# Patient Record
Sex: FEMALE | Race: OTHER | Employment: FULL TIME | URBAN - METROPOLITAN AREA
[De-identification: names, ages, dates, MRNs, and addresses within clinical notes are randomized per-mention and may not be internally consistent; named-entity substitution may affect disease eponyms.]

---

## 2017-03-03 ENCOUNTER — OFFICE VISIT (OUTPATIENT)
Dept: PEDIATRICS | Age: 57
End: 2017-03-03

## 2017-03-03 VITALS
TEMPERATURE: 98.7 F | DIASTOLIC BLOOD PRESSURE: 74 MMHG | HEART RATE: 80 BPM | OXYGEN SATURATION: 98 % | RESPIRATION RATE: 18 BRPM | SYSTOLIC BLOOD PRESSURE: 112 MMHG

## 2017-03-03 DIAGNOSIS — J01.90 ACUTE SINUSITIS, RECURRENCE NOT SPECIFIED, UNSPECIFIED LOCATION: Primary | ICD-10-CM

## 2017-03-03 PROCEDURE — 1036F TOBACCO NON-USER: CPT | Performed by: NURSE PRACTITIONER

## 2017-03-03 PROCEDURE — 3017F COLORECTAL CA SCREEN DOC REV: CPT | Performed by: NURSE PRACTITIONER

## 2017-03-03 PROCEDURE — G8484 FLU IMMUNIZE NO ADMIN: HCPCS | Performed by: NURSE PRACTITIONER

## 2017-03-03 PROCEDURE — G8427 DOCREV CUR MEDS BY ELIG CLIN: HCPCS | Performed by: NURSE PRACTITIONER

## 2017-03-03 PROCEDURE — 3014F SCREEN MAMMO DOC REV: CPT | Performed by: NURSE PRACTITIONER

## 2017-03-03 PROCEDURE — 99213 OFFICE O/P EST LOW 20 MIN: CPT | Performed by: NURSE PRACTITIONER

## 2017-03-03 PROCEDURE — G8421 BMI NOT CALCULATED: HCPCS | Performed by: NURSE PRACTITIONER

## 2017-03-03 RX ORDER — INFLUENZA VIRUS VACCINE 15; 15; 15; 15 UG/.5ML; UG/.5ML; UG/.5ML; UG/.5ML
SUSPENSION INTRAMUSCULAR
Refills: 0 | COMMUNITY
Start: 2016-12-29 | End: 2022-05-02

## 2017-03-03 RX ORDER — AZITHROMYCIN 250 MG/1
TABLET, FILM COATED ORAL
Qty: 1 PACKET | Refills: 0 | Status: SHIPPED | OUTPATIENT
Start: 2017-03-03 | End: 2018-03-15 | Stop reason: ALTCHOICE

## 2017-03-03 ASSESSMENT — ENCOUNTER SYMPTOMS
VOMITING: 0
NAUSEA: 0
EYE REDNESS: 0
ABDOMINAL PAIN: 0
WHEEZING: 0
HOARSE VOICE: 0
SORE THROAT: 1
SWOLLEN GLANDS: 1
EYE PAIN: 0
EYE DISCHARGE: 0
SHORTNESS OF BREATH: 0
COUGH: 1
SINUS PRESSURE: 1
TROUBLE SWALLOWING: 0

## 2017-04-10 ENCOUNTER — OFFICE VISIT (OUTPATIENT)
Dept: PEDIATRICS | Age: 57
End: 2017-04-10

## 2017-04-10 VITALS
WEIGHT: 138 LBS | OXYGEN SATURATION: 97 % | BODY MASS INDEX: 26.06 KG/M2 | TEMPERATURE: 98.9 F | DIASTOLIC BLOOD PRESSURE: 74 MMHG | HEART RATE: 83 BPM | RESPIRATION RATE: 17 BRPM | HEIGHT: 61 IN | SYSTOLIC BLOOD PRESSURE: 118 MMHG

## 2017-04-10 DIAGNOSIS — J01.90 ACUTE NON-RECURRENT SINUSITIS, UNSPECIFIED LOCATION: Primary | ICD-10-CM

## 2017-04-10 PROCEDURE — G8427 DOCREV CUR MEDS BY ELIG CLIN: HCPCS | Performed by: NURSE PRACTITIONER

## 2017-04-10 PROCEDURE — 99213 OFFICE O/P EST LOW 20 MIN: CPT | Performed by: NURSE PRACTITIONER

## 2017-04-10 PROCEDURE — 3014F SCREEN MAMMO DOC REV: CPT | Performed by: NURSE PRACTITIONER

## 2017-04-10 PROCEDURE — G8419 CALC BMI OUT NRM PARAM NOF/U: HCPCS | Performed by: NURSE PRACTITIONER

## 2017-04-10 PROCEDURE — 3017F COLORECTAL CA SCREEN DOC REV: CPT | Performed by: NURSE PRACTITIONER

## 2017-04-10 PROCEDURE — 1036F TOBACCO NON-USER: CPT | Performed by: NURSE PRACTITIONER

## 2017-04-10 RX ORDER — AZITHROMYCIN 250 MG/1
TABLET, FILM COATED ORAL
Qty: 1 PACKET | Refills: 0 | Status: SHIPPED | OUTPATIENT
Start: 2017-04-10 | End: 2018-03-15 | Stop reason: ALTCHOICE

## 2017-04-10 ASSESSMENT — ENCOUNTER SYMPTOMS
SINUS PRESSURE: 1
RHINORRHEA: 0
SWOLLEN GLANDS: 0
SHORTNESS OF BREATH: 0
WHEEZING: 0
COUGH: 1
HEARTBURN: 0
HOARSE VOICE: 1
SINUS COMPLAINT: 1
SORE THROAT: 0
HEMOPTYSIS: 0

## 2018-03-15 ENCOUNTER — OFFICE VISIT (OUTPATIENT)
Dept: PEDIATRICS CLINIC | Age: 58
End: 2018-03-15
Payer: COMMERCIAL

## 2018-03-15 VITALS
HEART RATE: 93 BPM | WEIGHT: 146.6 LBS | BODY MASS INDEX: 27.7 KG/M2 | OXYGEN SATURATION: 98 % | TEMPERATURE: 98.8 F | DIASTOLIC BLOOD PRESSURE: 80 MMHG | SYSTOLIC BLOOD PRESSURE: 120 MMHG

## 2018-03-15 DIAGNOSIS — J01.90 ACUTE BACTERIAL SINUSITIS: Primary | ICD-10-CM

## 2018-03-15 DIAGNOSIS — B96.89 ACUTE BACTERIAL SINUSITIS: Primary | ICD-10-CM

## 2018-03-15 PROCEDURE — 99214 OFFICE O/P EST MOD 30 MIN: CPT | Performed by: NURSE PRACTITIONER

## 2018-03-15 RX ORDER — AZITHROMYCIN 250 MG/1
TABLET, FILM COATED ORAL
Qty: 1 PACKET | Refills: 0 | Status: SHIPPED | OUTPATIENT
Start: 2018-03-15 | End: 2022-05-02

## 2018-04-05 PROBLEM — F41.1 ANXIETY STATE: Status: ACTIVE | Noted: 2018-04-05

## 2018-04-05 PROBLEM — N92.6 IRREGULAR PERIODS: Status: ACTIVE | Noted: 2018-04-05

## 2018-04-05 PROBLEM — L21.9 SEBORRHEA: Status: ACTIVE | Noted: 2018-04-05

## 2018-04-05 PROBLEM — M25.519 SHOULDER JOINT PAIN: Status: ACTIVE | Noted: 2018-04-05

## 2018-04-05 PROBLEM — F32.A DEPRESSIVE DISORDER: Status: ACTIVE | Noted: 2018-04-05

## 2018-04-05 PROBLEM — J32.9 CHRONIC SINUSITIS: Status: ACTIVE | Noted: 2018-04-05

## 2018-04-05 PROBLEM — M79.7 FIBROMYOSITIS: Status: ACTIVE | Noted: 2018-04-05

## 2018-04-05 PROBLEM — L40.9 PSORIASIS: Status: ACTIVE | Noted: 2018-04-05

## 2018-04-05 PROBLEM — M25.50 PAIN IN JOINT: Status: ACTIVE | Noted: 2018-04-05

## 2018-04-05 PROBLEM — N95.1 MENOPAUSAL SYMPTOM: Status: ACTIVE | Noted: 2018-04-05

## 2018-04-05 PROBLEM — M54.50 LOW BACK PAIN: Status: ACTIVE | Noted: 2018-04-05

## 2018-04-05 PROBLEM — R53.81 MALAISE AND FATIGUE: Status: ACTIVE | Noted: 2018-04-05

## 2018-04-05 PROBLEM — L25.5 CONTACT DERMATITIS DUE TO PLANTS, EXCEPT FOOD: Status: ACTIVE | Noted: 2018-04-05

## 2018-04-05 PROBLEM — E55.9 VITAMIN D DEFICIENCY: Status: ACTIVE | Noted: 2018-04-05

## 2018-04-05 PROBLEM — L20.9 ATOPIC DERMATITIS: Status: ACTIVE | Noted: 2018-04-05

## 2018-04-05 PROBLEM — R10.9 ABDOMINAL PAIN: Status: ACTIVE | Noted: 2018-04-05

## 2018-04-05 PROBLEM — R53.83 MALAISE AND FATIGUE: Status: ACTIVE | Noted: 2018-04-05

## 2018-04-05 PROBLEM — S13.9XXA NECK SPRAIN: Status: ACTIVE | Noted: 2018-04-05

## 2018-04-05 RX ORDER — CLOBETASOL PROPIONATE 0.05 G/100ML
SHAMPOO TOPICAL
Refills: 0 | COMMUNITY
Start: 2018-01-03 | End: 2022-05-02

## 2018-04-05 ASSESSMENT — ENCOUNTER SYMPTOMS
SHORTNESS OF BREATH: 0
HEMOPTYSIS: 0
DIARRHEA: 0
HEARTBURN: 0
ABDOMINAL PAIN: 0
SINUS PRESSURE: 1
SINUS PAIN: 1
COUGH: 1
VOMITING: 0
WHEEZING: 0
SORE THROAT: 1
RHINORRHEA: 1
TROUBLE SWALLOWING: 0

## 2019-03-22 ENCOUNTER — OFFICE VISIT (OUTPATIENT)
Dept: PEDIATRICS CLINIC | Age: 59
End: 2019-03-22
Payer: COMMERCIAL

## 2019-03-22 VITALS
SYSTOLIC BLOOD PRESSURE: 106 MMHG | TEMPERATURE: 98.2 F | RESPIRATION RATE: 14 BRPM | HEART RATE: 96 BPM | OXYGEN SATURATION: 98 % | DIASTOLIC BLOOD PRESSURE: 72 MMHG

## 2019-03-22 DIAGNOSIS — B96.89 ACUTE BACTERIAL SINUSITIS: Primary | ICD-10-CM

## 2019-03-22 DIAGNOSIS — J01.90 ACUTE BACTERIAL SINUSITIS: Primary | ICD-10-CM

## 2019-03-22 PROCEDURE — 1036F TOBACCO NON-USER: CPT | Performed by: NURSE PRACTITIONER

## 2019-03-22 PROCEDURE — G8428 CUR MEDS NOT DOCUMENT: HCPCS | Performed by: NURSE PRACTITIONER

## 2019-03-22 PROCEDURE — 3017F COLORECTAL CA SCREEN DOC REV: CPT | Performed by: NURSE PRACTITIONER

## 2019-03-22 PROCEDURE — G8484 FLU IMMUNIZE NO ADMIN: HCPCS | Performed by: NURSE PRACTITIONER

## 2019-03-22 PROCEDURE — 99213 OFFICE O/P EST LOW 20 MIN: CPT | Performed by: NURSE PRACTITIONER

## 2019-03-22 PROCEDURE — G8421 BMI NOT CALCULATED: HCPCS | Performed by: NURSE PRACTITIONER

## 2019-03-22 RX ORDER — AZITHROMYCIN 250 MG/1
250 TABLET, FILM COATED ORAL SEE ADMIN INSTRUCTIONS
Qty: 6 TABLET | Refills: 0 | Status: SHIPPED | OUTPATIENT
Start: 2019-03-22 | End: 2022-05-02 | Stop reason: SDUPTHER

## 2019-03-27 ASSESSMENT — ENCOUNTER SYMPTOMS
NAUSEA: 0
COUGH: 0
SINUS PRESSURE: 1
VOMITING: 0
SWOLLEN GLANDS: 0
RHINORRHEA: 1
HOARSE VOICE: 0
TROUBLE SWALLOWING: 0
SINUS COMPLAINT: 1
WHEEZING: 0
SINUS PAIN: 1
SORE THROAT: 0
ABDOMINAL PAIN: 0
SHORTNESS OF BREATH: 0

## 2021-01-07 LAB
SARS-COV-2: NOT DETECTED
SOURCE: NORMAL

## 2021-01-13 LAB
SARS-COV-2: NOT DETECTED
SOURCE: NORMAL

## 2021-01-20 LAB
SARS-COV-2: NOT DETECTED
SOURCE: NORMAL

## 2021-01-26 LAB
SARS-COV-2: NOT DETECTED
SOURCE: NORMAL

## 2021-02-03 LAB
SARS-COV-2: NOT DETECTED
SOURCE: NORMAL

## 2021-02-11 LAB
SARS-COV-2: NOT DETECTED
SOURCE: NORMAL

## 2021-02-23 LAB
SARS-COV-2: NOT DETECTED
SOURCE: NORMAL

## 2021-03-02 LAB
SARS-COV-2: NOT DETECTED
SOURCE: NORMAL

## 2021-03-09 LAB
SARS-COV-2: NOT DETECTED
SOURCE: NORMAL

## 2021-03-17 LAB
SARS-COV-2: NOT DETECTED
SOURCE: NORMAL

## 2022-05-02 ENCOUNTER — OFFICE VISIT (OUTPATIENT)
Dept: PEDIATRICS CLINIC | Age: 62
End: 2022-05-02
Payer: COMMERCIAL

## 2022-05-02 VITALS — TEMPERATURE: 98.4 F | WEIGHT: 141.4 LBS | OXYGEN SATURATION: 97 % | HEART RATE: 87 BPM | BODY MASS INDEX: 26.72 KG/M2

## 2022-05-02 DIAGNOSIS — B96.89 ACUTE BACTERIAL SINUSITIS: ICD-10-CM

## 2022-05-02 DIAGNOSIS — J01.90 ACUTE BACTERIAL SINUSITIS: ICD-10-CM

## 2022-05-02 PROBLEM — R23.8 FACIAL AGING: Status: ACTIVE | Noted: 2018-04-27

## 2022-05-02 PROCEDURE — 3017F COLORECTAL CA SCREEN DOC REV: CPT | Performed by: NURSE PRACTITIONER

## 2022-05-02 PROCEDURE — 99213 OFFICE O/P EST LOW 20 MIN: CPT | Performed by: NURSE PRACTITIONER

## 2022-05-02 PROCEDURE — 1036F TOBACCO NON-USER: CPT | Performed by: NURSE PRACTITIONER

## 2022-05-02 PROCEDURE — G8419 CALC BMI OUT NRM PARAM NOF/U: HCPCS | Performed by: NURSE PRACTITIONER

## 2022-05-02 PROCEDURE — G8427 DOCREV CUR MEDS BY ELIG CLIN: HCPCS | Performed by: NURSE PRACTITIONER

## 2022-05-02 RX ORDER — AZITHROMYCIN 250 MG/1
250 TABLET, FILM COATED ORAL SEE ADMIN INSTRUCTIONS
Qty: 6 TABLET | Refills: 0 | Status: SHIPPED | OUTPATIENT
Start: 2022-05-02 | End: 2022-05-07

## 2022-05-02 ASSESSMENT — ENCOUNTER SYMPTOMS
COUGH: 1
TROUBLE SWALLOWING: 0
EYE REDNESS: 0
EYE ITCHING: 0
NAUSEA: 0
CONSTIPATION: 0
ABDOMINAL PAIN: 0
SWOLLEN GLANDS: 1
WHEEZING: 0
DIARRHEA: 0
RHINORRHEA: 1
SHORTNESS OF BREATH: 0
HEMOPTYSIS: 0
EYE PAIN: 0
HOARSE VOICE: 1
SINUS PAIN: 1
SINUS PRESSURE: 1
EYE DISCHARGE: 0
VOMITING: 0
SORE THROAT: 1
HEARTBURN: 0
CHEST TIGHTNESS: 0

## 2022-05-02 ASSESSMENT — VISUAL ACUITY: OU: 1

## 2022-05-02 NOTE — PROGRESS NOTES
Subjective:      Patient ID: Stuart Vanessa is a 62 y.o. female who present today with:      Chief Complaint   Patient presents with    Pharyngitis    Sinusitis    Cough    Otalgia    Congestion     Sinusitis  This is a new problem. The current episode started in the past 7 days. The problem is unchanged. There has been no fever. Her pain is at a severity of 8/10. The pain is severe. Associated symptoms include congestion, coughing, ear pain, headaches, a hoarse voice, sinus pressure, a sore throat and swollen glands. Pertinent negatives include no chills, diaphoresis, neck pain, shortness of breath or sneezing. Treatments tried: Lozenges, vicks. The treatment provided no relief. Cough  This is a new problem. The current episode started in the past 7 days. The problem has been unchanged. The problem occurs constantly. The cough is non-productive. Associated symptoms include ear congestion, ear pain, headaches, nasal congestion, postnasal drip, rhinorrhea and a sore throat. Pertinent negatives include no chest pain, chills, eye redness, fever, heartburn, hemoptysis, myalgias, rash, shortness of breath, sweats, weight loss or wheezing. The symptoms are aggravated by lying down. She has tried rest (Lozenges, vicks) for the symptoms. The treatment provided no relief. Her past medical history is significant for environmental allergies. There is no history of asthma, bronchiectasis, bronchitis, COPD, emphysema or pneumonia.      Past Medical History:   Diagnosis Date    Fibromyalgia      Patient Active Problem List    Diagnosis Date Noted    Abdominal pain 04/05/2018    Anxiety state 04/05/2018    Atopic dermatitis 04/05/2018    Chronic sinusitis 04/05/2018    Contact dermatitis due to plants, except food 04/05/2018    Depressive disorder 04/05/2018    Fibromyositis 04/05/2018    Irregular periods 04/05/2018    Pain in joint 04/05/2018    Low back pain 04/05/2018    Malaise and fatigue 04/05/2018    Menopausal symptom 04/05/2018    Neck sprain 04/05/2018    Psoriasis 04/05/2018    Seborrhea 04/05/2018    Shoulder joint pain 04/05/2018    Vitamin D deficiency 04/05/2018     Past Surgical History:   Procedure Laterality Date    KNEE SURGERY      left knee liagaments     Social History     Socioeconomic History    Marital status:      Spouse name: None    Number of children: None    Years of education: None    Highest education level: None   Occupational History    None   Tobacco Use    Smoking status: Never Smoker    Smokeless tobacco: Never Used   Substance and Sexual Activity    Alcohol use: None    Drug use: None    Sexual activity: None   Other Topics Concern    None   Social History Narrative    None     Social Determinants of Health     Financial Resource Strain:     Difficulty of Paying Living Expenses: Not on file   Food Insecurity:     Worried About Running Out of Food in the Last Year: Not on file    Cass of Food in the Last Year: Not on file   Transportation Needs:     Lack of Transportation (Medical): Not on file    Lack of Transportation (Non-Medical):  Not on file   Physical Activity:     Days of Exercise per Week: Not on file    Minutes of Exercise per Session: Not on file   Stress:     Feeling of Stress : Not on file   Social Connections:     Frequency of Communication with Friends and Family: Not on file    Frequency of Social Gatherings with Friends and Family: Not on file    Attends Gnosticism Services: Not on file    Active Member of Clubs or Organizations: Not on file    Attends Club or Organization Meetings: Not on file    Marital Status: Not on file   Intimate Partner Violence:     Fear of Current or Ex-Partner: Not on file    Emotionally Abused: Not on file    Physically Abused: Not on file    Sexually Abused: Not on file   Housing Stability:     Unable to Pay for Housing in the Last Year: Not on file    Number of [place] in the Last Year: Not on file    Unstable Housing in the Last Year: Not on file     No current outpatient medications on file prior to visit. No current facility-administered medications on file prior to visit. Allergies   Allergen Reactions    Cephalosporins     Ibuprofen     Pcn [Penicillins]       Review of Systems   Constitutional: Positive for activity change. Negative for appetite change, chills, diaphoresis, fever and weight loss. HENT: Positive for congestion, ear pain, hoarse voice, postnasal drip, rhinorrhea, sinus pressure, sinus pain and sore throat. Negative for sneezing and trouble swallowing. Eyes: Negative for pain, discharge, redness and itching. Respiratory: Positive for cough. Negative for hemoptysis, chest tightness, shortness of breath and wheezing. Cardiovascular: Negative for chest pain. Gastrointestinal: Negative for abdominal pain, constipation, diarrhea, heartburn, nausea and vomiting. Musculoskeletal: Negative for myalgias and neck pain. Skin: Negative for rash. Allergic/Immunologic: Positive for environmental allergies. Neurological: Positive for headaches. Negative for seizures and syncope. Objective:      Vitals:    05/02/22 1312   Pulse: 87   Temp: 98.4 °F (36.9 °C)   TempSrc: Temporal   SpO2: 97%   Weight: 141 lb 6.4 oz (64.1 kg)     Physical Exam  Constitutional:       General: She is not in acute distress. Appearance: Normal appearance. She is ill-appearing. HENT:      Head: Normocephalic and atraumatic. Right Ear: Hearing, ear canal and external ear normal. A middle ear effusion is present. Left Ear: Hearing, ear canal and external ear normal. A middle ear effusion is present. Nose: Congestion and rhinorrhea present. Rhinorrhea is purulent. Right Turbinates: Enlarged and swollen. Left Turbinates: Enlarged and swollen. Right Sinus: Maxillary sinus tenderness and frontal sinus tenderness present.       Left Sinus: Maxillary sinus tenderness and frontal sinus tenderness present. Mouth/Throat:      Lips: Pink. Mouth: Mucous membranes are moist.      Pharynx: Uvula midline. Posterior oropharyngeal erythema present. No oropharyngeal exudate or uvula swelling. Tonsils: No tonsillar exudate. Eyes:      General: Lids are normal. Vision grossly intact. Extraocular Movements: Extraocular movements intact. Conjunctiva/sclera: Conjunctivae normal.      Pupils: Pupils are equal, round, and reactive to light. Cardiovascular:      Rate and Rhythm: Normal rate and regular rhythm. Heart sounds: Normal heart sounds. Pulmonary:      Effort: Pulmonary effort is normal.      Breath sounds: Normal breath sounds and air entry. Lymphadenopathy:      Head:      Right side of head: Submandibular and tonsillar adenopathy present. Left side of head: Submandibular and tonsillar adenopathy present. Cervical: Cervical adenopathy present. Skin:     General: Skin is warm and dry. Findings: No rash. Neurological:      Mental Status: She is alert. Assessment & Plan:   Kusum Stewart was seen today for pharyngitis, sinusitis, cough, otalgia and congestion. Diagnoses and all orders for this visit:    Acute bacterial sinusitis  -     azithromycin (ZITHROMAX) 250 MG tablet; Take 1 tablet by mouth See Admin Instructions for 5 days 500mg on day 1 followed by 250mg on days 2 - 5    Side effects, adverse effects of the medication prescribed today, as well as treatment plan/ rationale and result expectations have been discussed with the patient who expresses understanding and desires to proceed. Close follow up to evaluate treatment results and for coordination of care. I have reviewed the patient's medical history in detail and updated the computerized patient record. As always, patient is advised that if symptoms worsen in any way they will proceed to the nearest emergency room.      Follow up in 48-72 hours if symptoms persist or worsen and as needed    [facility], APRN - CNP

## 2023-01-03 ENCOUNTER — OFFICE VISIT (OUTPATIENT)
Dept: PEDIATRICS CLINIC | Age: 63
End: 2023-01-03
Payer: COMMERCIAL

## 2023-01-03 VITALS — TEMPERATURE: 98.3 F | RESPIRATION RATE: 14 BRPM | OXYGEN SATURATION: 98 % | HEART RATE: 82 BPM

## 2023-01-03 DIAGNOSIS — J01.90 ACUTE BACTERIAL SINUSITIS: ICD-10-CM

## 2023-01-03 DIAGNOSIS — B96.89 ACUTE BACTERIAL SINUSITIS: ICD-10-CM

## 2023-01-03 PROCEDURE — G8484 FLU IMMUNIZE NO ADMIN: HCPCS | Performed by: NURSE PRACTITIONER

## 2023-01-03 PROCEDURE — 99213 OFFICE O/P EST LOW 20 MIN: CPT | Performed by: NURSE PRACTITIONER

## 2023-01-03 PROCEDURE — G8427 DOCREV CUR MEDS BY ELIG CLIN: HCPCS | Performed by: NURSE PRACTITIONER

## 2023-01-03 PROCEDURE — 1036F TOBACCO NON-USER: CPT | Performed by: NURSE PRACTITIONER

## 2023-01-03 PROCEDURE — G8419 CALC BMI OUT NRM PARAM NOF/U: HCPCS | Performed by: NURSE PRACTITIONER

## 2023-01-03 PROCEDURE — 3017F COLORECTAL CA SCREEN DOC REV: CPT | Performed by: NURSE PRACTITIONER

## 2023-01-03 RX ORDER — PREDNISONE 20 MG/1
40 TABLET ORAL DAILY
Qty: 10 TABLET | Refills: 0 | Status: SHIPPED | OUTPATIENT
Start: 2023-01-03 | End: 2023-01-08

## 2023-01-03 RX ORDER — AZITHROMYCIN 250 MG/1
250 TABLET, FILM COATED ORAL SEE ADMIN INSTRUCTIONS
Qty: 6 TABLET | Refills: 0 | Status: SHIPPED | OUTPATIENT
Start: 2023-01-03 | End: 2023-01-08

## 2023-01-03 RX ORDER — BUSPIRONE HYDROCHLORIDE 7.5 MG/1
TABLET ORAL
COMMUNITY
Start: 2022-12-15

## 2023-01-03 RX ORDER — FLUOCINONIDE TOPICAL SOLUTION USP, 0.05% 0.5 MG/ML
SOLUTION TOPICAL
COMMUNITY
Start: 2022-12-15

## 2023-01-03 ASSESSMENT — ENCOUNTER SYMPTOMS
SHORTNESS OF BREATH: 0
SORE THROAT: 1
SINUS PAIN: 1
CONSTIPATION: 0
EYE REDNESS: 0
EYE PAIN: 0
COUGH: 1
CHEST TIGHTNESS: 1
WHEEZING: 1
HEARTBURN: 0
NAUSEA: 1
RHINORRHEA: 1
HEMOPTYSIS: 0
ABDOMINAL PAIN: 1
TROUBLE SWALLOWING: 0
SINUS PRESSURE: 1
VOMITING: 0
EYE DISCHARGE: 0
DIARRHEA: 0
EYE ITCHING: 0

## 2023-01-03 ASSESSMENT — VISUAL ACUITY: OU: 1

## 2023-01-03 NOTE — PROGRESS NOTES
Subjective:      Patient ID: Dixie Malhotra is a 65 y.o. female who present today with:      Chief Complaint   Patient presents with    Cough    Congestion     Cough  This is a new problem. The current episode started 1 to 4 weeks ago. The problem has been rapidly worsening. The problem occurs constantly. The cough is Non-productive. Associated symptoms include chest pain, ear congestion, headaches, nasal congestion, postnasal drip, rhinorrhea, a sore throat and wheezing. Pertinent negatives include no chills, ear pain, eye redness, fever, heartburn, hemoptysis, myalgias, rash, shortness of breath, sweats or weight loss. The symptoms are aggravated by lying down. She has tried OTC cough suppressant for the symptoms. The treatment provided no relief. There is no history of asthma, bronchiectasis, bronchitis, COPD, emphysema, environmental allergies or pneumonia.        Past Medical History:   Diagnosis Date    Fibromyalgia      Patient Active Problem List    Diagnosis Date Noted    Facial aging 04/27/2018    Displacement of lumbar intervertebral disc without myelopathy 11/05/2001    Sprain of lumbar region 11/05/2001    Abdominal pain 04/05/2018    Anxiety state 04/05/2018    Atopic dermatitis 04/05/2018    Chronic sinusitis 04/05/2018    Contact dermatitis due to plants, except food 04/05/2018    Depressive disorder 04/05/2018    Fibromyositis 04/05/2018    Irregular periods 04/05/2018    Pain in joint 04/05/2018    Low back pain 04/05/2018    Malaise and fatigue 04/05/2018    Menopausal symptom 04/05/2018    Neck sprain 04/05/2018    Psoriasis 04/05/2018    Seborrhea 04/05/2018    Shoulder joint pain 04/05/2018    Vitamin D deficiency 04/05/2018     Past Surgical History:   Procedure Laterality Date    KNEE SURGERY      left knee liagaments     Social History     Socioeconomic History    Marital status:      Spouse name: None    Number of children: None    Years of education: None    Highest education level: None   Tobacco Use    Smoking status: Never    Smokeless tobacco: Never     Current Outpatient Medications on File Prior to Visit   Medication Sig Dispense Refill    busPIRone (BUSPAR) 7.5 MG tablet TAKE 1 TABLET BY MOUTH TWICE A DAY FOR ANXIETY      fluocinonide (LIDEX) 0.05 % external solution APPLY TO SCALP 3 TIMES A WEEK- AND LEAVE IN -DO NOT [address] OUT       No current facility-administered medications on file prior to visit. Allergies   Allergen Reactions    Cephalosporins     Ibuprofen     Pcn [Penicillins]            Review of Systems   Constitutional:  Positive for activity change, appetite change and fatigue. Negative for chills, fever and weight loss. HENT:  Positive for congestion, postnasal drip, rhinorrhea, sinus pressure, sinus pain and sore throat. Negative for ear pain and trouble swallowing. Eyes:  Negative for pain, discharge, redness and itching. Respiratory:  Positive for cough, chest tightness and wheezing. Negative for hemoptysis and shortness of breath. Cardiovascular:  Positive for chest pain. Gastrointestinal:  Positive for abdominal pain and nausea. Negative for constipation, diarrhea, heartburn and vomiting. Musculoskeletal:  Negative for arthralgias and myalgias. Skin:  Negative for rash. Allergic/Immunologic: Negative for environmental allergies. Neurological:  Positive for headaches. Negative for seizures and syncope. Objective:      Vitals:    01/03/23 1018   Pulse: 82   Resp: 14   Temp: 98.3 °F (36.8 °C)   TempSrc: Temporal   SpO2: 98%         Physical Exam  Constitutional:       General: She is not in acute distress. Appearance: Normal appearance. She is not ill-appearing. HENT:      Head: Normocephalic and atraumatic. Right Ear: Hearing, ear canal and external ear normal. A middle ear effusion is present. Left Ear: Hearing, ear canal and external ear normal. A middle ear effusion is present. Nose: Congestion and rhinorrhea present. Rhinorrhea is purulent. Right Sinus: Maxillary sinus tenderness and frontal sinus tenderness present. Left Sinus: Maxillary sinus tenderness and frontal sinus tenderness present. Mouth/Throat:      Lips: Pink. Mouth: Mucous membranes are moist.      Pharynx: Uvula midline. Posterior oropharyngeal erythema present. No pharyngeal swelling, oropharyngeal exudate or uvula swelling. Tonsils: No tonsillar exudate. Eyes:      General: Lids are normal. Vision grossly intact. Extraocular Movements: Extraocular movements intact. Conjunctiva/sclera: Conjunctivae normal.      Pupils: Pupils are equal, round, and reactive to light. Cardiovascular:      Rate and Rhythm: Normal rate and regular rhythm. Heart sounds: Normal heart sounds. Pulmonary:      Effort: Pulmonary effort is normal.      Breath sounds: Wheezing present. Comments: Scant expiratory wheeze upper lobes bilaterally  Lymphadenopathy:      Head:      Right side of head: Submandibular and tonsillar adenopathy present. Left side of head: Submandibular and tonsillar adenopathy present. Cervical: Cervical adenopathy present. Skin:     General: Skin is warm and dry. Findings: No rash. Neurological:      Mental Status: She is alert. Assessment & Plan:   Sonja Veras was seen today for cough and congestion. Diagnoses and all orders for this visit:    Acute bacterial sinusitis  -     azithromycin (ZITHROMAX) 250 MG tablet; Take 1 tablet by mouth See Admin Instructions for 5 days 500mg on day 1 followed by 250mg on days 2 - 5  -     predniSONE (DELTASONE) 20 MG tablet; Take 2 tablets by mouth daily for 5 days    Side effects, adverse effects of the medication prescribed today, as well as treatment plan/ rationale and result expectations have been discussed with the patient who expresses understanding and desires to proceed. Close follow up to evaluate treatment results and for coordination of care.   I have reviewed the patient's medical history in detail and updated the computerized patient record. As always, patient is advised that if symptoms worsen in any way they will proceed to the nearest emergency room.      Follow up in 48-72 hours if symptoms persist or worsen and as needed    Saeed Prom, APRN - CNP

## 2023-02-27 LAB
ALANINE AMINOTRANSFERASE (SGPT) (U/L) IN SER/PLAS: 18 U/L (ref 7–45)
ALBUMIN (G/DL) IN SER/PLAS: 4.5 G/DL (ref 3.4–5)
ALKALINE PHOSPHATASE (U/L) IN SER/PLAS: 54 U/L (ref 33–136)
ANION GAP IN SER/PLAS: 12 MMOL/L (ref 10–20)
ASPARTATE AMINOTRANSFERASE (SGOT) (U/L) IN SER/PLAS: 22 U/L (ref 9–39)
BILIRUBIN TOTAL (MG/DL) IN SER/PLAS: 0.5 MG/DL (ref 0–1.2)
CALCIUM (MG/DL) IN SER/PLAS: 9.8 MG/DL (ref 8.6–10.3)
CARBON DIOXIDE, TOTAL (MMOL/L) IN SER/PLAS: 30 MMOL/L (ref 21–32)
CHLORIDE (MMOL/L) IN SER/PLAS: 101 MMOL/L (ref 98–107)
CHOLESTEROL (MG/DL) IN SER/PLAS: 185 MG/DL (ref 0–199)
CHOLESTEROL IN HDL (MG/DL) IN SER/PLAS: 83.7 MG/DL
CHOLESTEROL/HDL RATIO: 2.2
CREATININE (MG/DL) IN SER/PLAS: 0.63 MG/DL (ref 0.5–1.05)
GFR FEMALE: >90 ML/MIN/1.73M2
GLUCOSE (MG/DL) IN SER/PLAS: 82 MG/DL (ref 74–99)
LDL: 71 MG/DL (ref 0–99)
POTASSIUM (MMOL/L) IN SER/PLAS: 4 MMOL/L (ref 3.5–5.3)
PROTEIN TOTAL: 7.5 G/DL (ref 6.4–8.2)
SODIUM (MMOL/L) IN SER/PLAS: 139 MMOL/L (ref 136–145)
TRIGLYCERIDE (MG/DL) IN SER/PLAS: 150 MG/DL (ref 0–149)
UREA NITROGEN (MG/DL) IN SER/PLAS: 13 MG/DL (ref 6–23)
VLDL: 30 MG/DL (ref 0–40)

## 2023-05-08 ENCOUNTER — TELEPHONE (OUTPATIENT)
Dept: PRIMARY CARE | Facility: CLINIC | Age: 63
End: 2023-05-08
Payer: COMMERCIAL

## 2023-05-08 DIAGNOSIS — J06.9 ACUTE URI: Primary | ICD-10-CM

## 2023-05-08 PROBLEM — E55.9 VITAMIN D DEFICIENCY: Status: ACTIVE | Noted: 2023-05-08

## 2023-05-08 PROBLEM — R10.2 FEMALE PELVIC PAIN: Status: ACTIVE | Noted: 2023-05-08

## 2023-05-08 PROBLEM — N81.10 FEMALE BLADDER PROLAPSE: Status: ACTIVE | Noted: 2023-05-08

## 2023-05-08 PROBLEM — R31.9 HEMATURIA: Status: ACTIVE | Noted: 2023-05-08

## 2023-05-08 PROBLEM — M79.7 FIBROMYALGIA: Status: ACTIVE | Noted: 2023-05-08

## 2023-05-08 PROBLEM — R35.0 URINARY FREQUENCY: Status: ACTIVE | Noted: 2023-05-08

## 2023-05-08 PROBLEM — J30.9 ALLERGIC RHINITIS: Status: ACTIVE | Noted: 2023-05-08

## 2023-05-08 PROBLEM — F41.9 ACUTE ANXIETY: Status: ACTIVE | Noted: 2023-05-08

## 2023-05-08 PROBLEM — L40.9 PSORIASIS: Status: ACTIVE | Noted: 2023-05-08

## 2023-05-08 PROBLEM — G47.33 MODERATE OBSTRUCTIVE SLEEP APNEA: Status: ACTIVE | Noted: 2023-05-08

## 2023-05-08 PROBLEM — K21.9 GERD (GASTROESOPHAGEAL REFLUX DISEASE): Status: ACTIVE | Noted: 2023-05-08

## 2023-05-08 PROBLEM — E78.5 HYPERLIPIDEMIA: Status: ACTIVE | Noted: 2023-05-08

## 2023-05-08 PROBLEM — K75.81 NASH (NONALCOHOLIC STEATOHEPATITIS): Status: ACTIVE | Noted: 2023-05-08

## 2023-05-08 RX ORDER — ROSUVASTATIN CALCIUM 5 MG/1
5 TABLET, COATED ORAL DAILY
COMMUNITY
End: 2023-09-10

## 2023-05-08 RX ORDER — AZITHROMYCIN 250 MG/1
250 TABLET, FILM COATED ORAL DAILY
COMMUNITY
Start: 2023-01-03 | End: 2023-05-08 | Stop reason: SDUPTHER

## 2023-05-08 RX ORDER — DESONIDE 0.5 MG/G
1 CREAM TOPICAL 2 TIMES DAILY
COMMUNITY
Start: 2023-01-26

## 2023-05-08 RX ORDER — ESTRADIOL 10 UG/1
10 INSERT VAGINAL 2 TIMES WEEKLY
COMMUNITY
Start: 2022-11-23 | End: 2023-05-09 | Stop reason: SDUPTHER

## 2023-05-08 RX ORDER — BUSPIRONE HYDROCHLORIDE 7.5 MG/1
7.5 TABLET ORAL 2 TIMES DAILY
COMMUNITY
End: 2023-06-25

## 2023-05-08 RX ORDER — TRIAMCINOLONE ACETONIDE 1 MG/G
1 CREAM TOPICAL 2 TIMES DAILY
COMMUNITY
Start: 2023-01-26 | End: 2024-05-29 | Stop reason: ALTCHOICE

## 2023-05-08 RX ORDER — AZITHROMYCIN 250 MG/1
TABLET, FILM COATED ORAL
Qty: 6 TABLET | Refills: 0 | Status: SHIPPED | OUTPATIENT
Start: 2023-05-08 | End: 2023-11-13 | Stop reason: ALTCHOICE

## 2023-05-08 RX ORDER — FLUOCINONIDE TOPICAL SOLUTION USP, 0.05% 0.5 MG/ML
1 SOLUTION TOPICAL 3 TIMES WEEKLY
COMMUNITY
Start: 2022-12-15

## 2023-05-08 RX ORDER — ACETAMINOPHEN 500 MG
500 TABLET ORAL EVERY 6 HOURS PRN
COMMUNITY
Start: 2021-12-29 | End: 2024-05-29 | Stop reason: ALTCHOICE

## 2023-05-08 RX ORDER — ERGOCALCIFEROL 1.25 MG/1
50000 CAPSULE ORAL 2 TIMES WEEKLY
COMMUNITY
Start: 2021-11-10

## 2023-05-08 RX ORDER — HYDROGEN PEROXIDE 3 %
20 SOLUTION, NON-ORAL MISCELLANEOUS
COMMUNITY
Start: 2021-11-10 | End: 2023-05-09 | Stop reason: ALTCHOICE

## 2023-05-08 RX ORDER — KETOCONAZOLE 20 MG/G
1 CREAM TOPICAL 2 TIMES DAILY
COMMUNITY
Start: 2022-11-21 | End: 2023-05-09 | Stop reason: ALTCHOICE

## 2023-05-08 NOTE — TELEPHONE ENCOUNTER
She has a severe headache, drainage, sinus infection  lot of phlegm, sore throat, ear pain going on for a few weeks, she has an appointment for Thursday but needs help today Please call asap let her know

## 2023-05-08 NOTE — PROGRESS NOTES
Subjective   Patient ID: Brianda Waller is a 62 y.o. female who presents for her 6 month follow up multiple medical conditions       She is not a morning person. She has a hard time getting motivated in the mornings  She is taking Buspar daily     She is not using the Vagifem. She thought she was supposed to use it for a week.  She complains of urinary frequency and leakage    She hopes to retire in 3 years.   She would like to retire sooner but her daughter is in college and she is helping her   Her daughter is in engineering for the first year and it has been very intense  Her daughter has decided she wants to do chemical engineering     She complains of cough and PND.  She has been taking allergy medications for 90 days and when she stopped her Sx returned   She started with a ST and otalgia prior to beginning allergy medication   Yesterday 5/8/2023, she was dizzy and had a HA     HEALTH:  PAP 4/13 , 8/17 -and HPV- and 11/10/2022- and Q 5   Mammo 2/13 , 6/17 , 5/18, 7/19, 2/2022, 3/2023   BD 2/13 T+1.3 and 2/2022 T-1.0   Colon 7/16 + benign polyps 7- 10 years  Fibroscan 8/18  EKG 2012 , 12/15, 6/17, 8/18, 4/2020   Urine 8/18, 7/19 she was unable to provide an adequate sample   Hep C 7/18 -  Hep B ab +   FLu 2014, 12/15 , 10/18, 11/2021, 9/2022   TDAP 2010, 12/1/2021  MMR - consider booster with travel   Prevnar never   Pneumovax never   Shingrix 5/14/2022 and 8/6/2022  Moderna CVD 2/26/2021 and 3/26/2021 booster 11/13/2021, 11/6/2022   Ophth She sees Dr Henry. No glaucoma or MD. She has had LASIK OU in the past       Review of Systems  All systems negative except those listed in the HPI      Objective   Visit Vitals  /70 (BP Location: Left arm, Patient Position: Sitting, BP Cuff Size: Small adult)   Pulse 92   Temp 35.9 °C (96.6 °F) (Skin)    Body mass index is 25.84 kg/m².     Physical Exam  Vitals reviewed.   Constitutional:       Appearance: Normal appearance. She is normal weight.   HENT:      Head:  Normocephalic.      Comments: Edema submandibular gland and left posterior LN, tenderness maxillary sinus      Right Ear: Tympanic membrane, ear canal and external ear normal.      Left Ear: Tympanic membrane, ear canal and external ear normal.      Nose: Nose normal.      Mouth/Throat:      Pharynx: Oropharynx is clear.   Eyes:      Conjunctiva/sclera: Conjunctivae normal.   Cardiovascular:      Rate and Rhythm: Normal rate and regular rhythm.      Pulses: Normal pulses.      Heart sounds: Normal heart sounds.   Pulmonary:      Effort: Pulmonary effort is normal.      Breath sounds: Normal breath sounds.   Abdominal:      General: Bowel sounds are normal.      Palpations: Abdomen is soft.   Musculoskeletal:         General: Normal range of motion.      Cervical back: Normal range of motion and neck supple.      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   Neurological:      General: No focal deficit present.      Mental Status: She is alert and oriented to person, place, and time.   Psychiatric:         Mood and Affect: Mood normal.         Behavior: Behavior normal.         Thought Content: Thought content normal.         Judgment: Judgment normal.       Assessment/Plan   Problem List Items Addressed This Visit       Acute anxiety    Acute URI - Primary    Fibromyalgia    Hyperlipidemia    MORENO (nonalcoholic steatohepatitis)    Urinary frequency       Follow up completed   Reviewed her labs from 2/2023   Labs ordered - she will have these drawn in 6/2023    Acute URI :  Edema submandibular gland and left posterior LN, tenderness maxillary sinus, no wheezing or rattles noted 5/2023    She complains of cough and PND.  She has been taking allergy medications for 90 days, when she stopped her Sx returned   She started with a ST and otalgia prior to beginning allergy medication   Yesterday 5/8/2023, she was dizzy and had a HA   Her gums and teeth are painful   Prescription sent in for  Zpak to take as directed, possible side effects discussed with medication   Encouraged rest and increased hydration with warm fluids   She will call if Sx persist or worsen     Her weight/ BMI is in normal range if office, recommend she maintain   She is exercising routinely and stays active throughout the day     HTN: Stable.   She stopped Losartan. We will continue to monitor   EKG 4/29/2020 was NSR with HR of 67, no evidence of acute injury.   Stress test 5/2020 was normal   She will continue to monitor her BP at home and call with elevated readings.     I have spent 15 min face to face with this patient discussing their cardiac risk and behavioral therapies of nutrition choices and exercise. We are trying to eliminate habits that are contributing to their cardiac risk.  We agreed on a plan of how they can reduce their current CV risk   The patient's 10 yr CV risk was estimated at 2 %. There is no benefit to adding medications at this time 5/2023     Hx of elevated lipids -  LDL 71 and HDL 83 on labs in 2/2023. Labs ordered and we will adjust if indicated  5/2023   Explained goal for LDL to be less than 100 and ideally less than 70   Continue Crestor 5 mg daily   Discussed diet and exercise.   Recommend she add Metamucil 1 Tblsp daily     MORENO with Stage 1 fibrosis-  Labs ordered and we will adjust if indicated  5/2023   Dx with RUQ u/s in 7/18  Fibroscan in 8/18 revealed medium liver stiffness of 8.3. She has stage 2 severe fatty liver changes.   US elastography 12/2021 Echogenic liver, likely representing a degree of hepatic steatosis. No focal lesions. Shear wave elastography does not demonstrate significant hepatic fibrosis.   She saw nephrology in 8/2022 and told she has Stage I fibrosis   She has seen Dr Quinones and it is felt to be cholesterol related.   She saw Dr Bess at  10/2022 and felt fatty liver cholesterol related.   She is in a research protocol at   She needs to reduce her sugar intake and  I have advised her on dietary changes.   She is immune to Hep B 11/2022     LE edema:    Recommend wearing support stockings daily   Elevate legs throughout the day and for 45 minutes at night     Epigastric pain: DDx discussed for Sx.   Continue OTC Nexium 20 mg daily for 2 weeks.  US elastography 12/2021 showed fatty liver but no cirrhosis     Depression/anxiety - She is taking Buspar and it really helps her anxiety   She hopes to retire in 3 years.   She would like to retire sooner but her daughter is in college and she is helping her   Her daughter is in engineering for the first year and it has been very intense  Her daughter has decided she wants to do chemical engineering    Some of her stressors are work related    The house is empty with her at college.   She misses her daughter and trying to get used to her absence.   She is not dating. She does not have a chance to meet others any more.   Recommend she consider talking with a therapist.   The therapist she was seeing during the divorce is no longer here   She stopped Wellbutrin and Viibryd since she did not feel it helped with her Sx.  Recommend she begin 3 mcg Melatonin at night to help with sleep   She does go out with friends but they are    Continue Buspar 7.5 mg BID. She may try to wean off Buspar this summer. She can decrease Buspar to 7.5 mg once a day for a week then 1/2 tablet daily for a week then stop.     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   She can try OTC pain relieving creams with Lidocaine for pain relief   She saw ortho and she has a splint to wear     Moderate RIK:   She saw Mirella Acosta on 6/19/2020 and diagnosed with Moderate RIK  Continue Flonase NS nightly   She would like to start with OAT eval with Dr Montoya and order in but did not go because of COVID pandemic     Psoriasis in the scalp:  She is using  clobetasol propionate shampoo prn.     Vit D def: Labs ordered, we will adjust if indicated 11/2022  Continue OTC Vitamin D 200 UT daily    PAP normal on 11/10/2022. She is not using the Vagifem. She misunderstood the directions. She complains of urinary frequency and leakage. Restart Vagifem to use twice weekly as directed    Vaginal exam shows mild bladder prolapse, uterine prolapse noted, vaginal dryness and atrophy at vaginal opening 11/2022  She is not dating anyone and is not sexually active     Mammo was normal in 3/2023.   Breast exam normal except dense bilaterally 11/2022  BD 2/2022 T-1.0. Recommend calcium 600 mg BID and OTC Vitamin D 2000 UT daily and eat 2 servings of calcium enriched foods daily. Discussed importance of weight bearing exercises     Colon was + polyp in 2016 and 7- 10 years   No FmHx of colon cancer     Ophth:  She sees Dr Henry. No glaucoma or MD. She has had LASIK OU in the past  She will have her next eye exam faxed to my office in order to update her medical records.      Hep C 7/18 -  Hep B ab +   FLu 2014, 12/15 , 10/18, 11/2021, 9/2022   TDAP 2010, 12/1/2021  MMR - consider booster with travel   Prevnar never   Pneumovax never   Shingrix 5/14/2022 and 8/6/2022  Moderna CVD 2/26/2021 and 3/26/2021 booster 11/13/2021, 11/6/2022     RTC in 6 months     Scribe Attestation  By signing my name below, ILilia , Scribe   attest that this documentation has been prepared under the direction and in the presence of Mila Torres MD.

## 2023-05-09 ENCOUNTER — OFFICE VISIT (OUTPATIENT)
Dept: PRIMARY CARE | Facility: CLINIC | Age: 63
End: 2023-05-09
Payer: COMMERCIAL

## 2023-05-09 VITALS
WEIGHT: 139 LBS | BODY MASS INDEX: 25.58 KG/M2 | DIASTOLIC BLOOD PRESSURE: 70 MMHG | HEART RATE: 92 BPM | HEIGHT: 62 IN | TEMPERATURE: 96.6 F | SYSTOLIC BLOOD PRESSURE: 110 MMHG

## 2023-05-09 DIAGNOSIS — E78.2 MIXED HYPERLIPIDEMIA: ICD-10-CM

## 2023-05-09 DIAGNOSIS — F41.9 ACUTE ANXIETY: ICD-10-CM

## 2023-05-09 DIAGNOSIS — K75.81 NASH (NONALCOHOLIC STEATOHEPATITIS): ICD-10-CM

## 2023-05-09 DIAGNOSIS — M79.7 FIBROMYALGIA: ICD-10-CM

## 2023-05-09 DIAGNOSIS — N81.10 FEMALE BLADDER PROLAPSE: ICD-10-CM

## 2023-05-09 DIAGNOSIS — J06.9 ACUTE URI: Primary | ICD-10-CM

## 2023-05-09 DIAGNOSIS — R35.0 URINARY FREQUENCY: ICD-10-CM

## 2023-05-09 PROBLEM — K21.9 GERD (GASTROESOPHAGEAL REFLUX DISEASE): Status: RESOLVED | Noted: 2023-05-08 | Resolved: 2023-05-09

## 2023-05-09 PROBLEM — R10.2 FEMALE PELVIC PAIN: Status: RESOLVED | Noted: 2023-05-08 | Resolved: 2023-05-09

## 2023-05-09 PROCEDURE — 99214 OFFICE O/P EST MOD 30 MIN: CPT | Performed by: INTERNAL MEDICINE

## 2023-05-09 PROCEDURE — 1036F TOBACCO NON-USER: CPT | Performed by: INTERNAL MEDICINE

## 2023-05-09 RX ORDER — ESTRADIOL 10 UG/1
10 INSERT VAGINAL 2 TIMES WEEKLY
Qty: 24 TABLET | Refills: 3 | Status: SHIPPED | OUTPATIENT
Start: 2023-05-11 | End: 2024-05-29 | Stop reason: SDUPTHER

## 2023-05-11 ENCOUNTER — APPOINTMENT (OUTPATIENT)
Dept: PRIMARY CARE | Facility: CLINIC | Age: 63
End: 2023-05-11
Payer: COMMERCIAL

## 2023-06-25 DIAGNOSIS — M79.7 FIBROMYALGIA: ICD-10-CM

## 2023-06-25 RX ORDER — BUSPIRONE HYDROCHLORIDE 7.5 MG/1
TABLET ORAL
Qty: 180 TABLET | Refills: 1 | Status: SHIPPED | OUTPATIENT
Start: 2023-06-25 | End: 2024-01-09

## 2023-09-09 DIAGNOSIS — E78.2 MIXED HYPERLIPIDEMIA: Primary | ICD-10-CM

## 2023-09-10 RX ORDER — ROSUVASTATIN CALCIUM 5 MG/1
5 TABLET, COATED ORAL DAILY
Qty: 90 TABLET | Refills: 1 | Status: SHIPPED | OUTPATIENT
Start: 2023-09-10 | End: 2023-09-14 | Stop reason: SDUPTHER

## 2023-09-14 DIAGNOSIS — E78.2 MIXED HYPERLIPIDEMIA: ICD-10-CM

## 2023-09-14 RX ORDER — ROSUVASTATIN CALCIUM 5 MG/1
5 TABLET, COATED ORAL DAILY
Qty: 90 TABLET | Refills: 1 | Status: SHIPPED | OUTPATIENT
Start: 2023-09-14 | End: 2023-11-13 | Stop reason: ALTCHOICE

## 2023-11-11 PROBLEM — M79.7 FIBROMYOSITIS: Status: ACTIVE | Noted: 2018-04-05

## 2023-11-11 PROBLEM — L20.9 ATOPIC DERMATITIS: Status: ACTIVE | Noted: 2018-04-05

## 2023-11-11 PROBLEM — R53.81 MALAISE AND FATIGUE: Status: ACTIVE | Noted: 2018-04-05

## 2023-11-11 PROBLEM — N95.1 MENOPAUSAL SYMPTOM: Status: ACTIVE | Noted: 2018-04-05

## 2023-11-11 PROBLEM — N92.6 IRREGULAR PERIODS: Status: ACTIVE | Noted: 2018-04-05

## 2023-11-11 PROBLEM — R10.9 ABDOMINAL PAIN: Status: ACTIVE | Noted: 2018-04-05

## 2023-11-11 PROBLEM — F32.A DEPRESSIVE DISORDER: Status: ACTIVE | Noted: 2018-04-05

## 2023-11-11 PROBLEM — L25.5 CONTACT DERMATITIS DUE TO PLANTS, EXCEPT FOOD: Status: ACTIVE | Noted: 2018-04-05

## 2023-11-11 PROBLEM — L21.9 SEBORRHEA: Status: ACTIVE | Noted: 2018-04-05

## 2023-11-11 PROBLEM — R23.8 FACIAL AGING: Status: ACTIVE | Noted: 2018-04-27

## 2023-11-11 PROBLEM — J32.9 CHRONIC SINUSITIS: Status: ACTIVE | Noted: 2018-04-05

## 2023-11-11 PROBLEM — N95.2 VAGINAL ATROPHY: Status: ACTIVE | Noted: 2023-11-11

## 2023-11-11 PROBLEM — R53.83 MALAISE AND FATIGUE: Status: ACTIVE | Noted: 2018-04-05

## 2023-11-11 PROBLEM — N81.4 UTERINE PROLAPSE: Status: ACTIVE | Noted: 2023-11-11

## 2023-11-11 RX ORDER — HYDROGEN PEROXIDE 3 %
20 SOLUTION, NON-ORAL MISCELLANEOUS DAILY
COMMUNITY

## 2023-11-11 RX ORDER — MAGNESIUM OXIDE/MAG AA CHELATE 300 MG
CAPSULE ORAL
COMMUNITY

## 2023-11-11 RX ORDER — ALPRAZOLAM 0.5 MG/1
TABLET ORAL EVERY 12 HOURS
COMMUNITY
Start: 2020-04-29 | End: 2024-02-03 | Stop reason: WASHOUT

## 2023-11-11 RX ORDER — ELECTROLYTES/DEXTROSE
SOLUTION, ORAL ORAL
COMMUNITY
End: 2023-11-13 | Stop reason: ALTCHOICE

## 2023-11-11 RX ORDER — FLUOCINOLONE ACETONIDE 0.11 MG/ML
OIL TOPICAL 2 TIMES DAILY
COMMUNITY
Start: 2023-06-06 | End: 2024-05-29 | Stop reason: ALTCHOICE

## 2023-11-11 RX ORDER — PHENAZOPYRIDINE HYDROCHLORIDE 100 MG/1
TABLET, FILM COATED ORAL
COMMUNITY
Start: 2020-09-01 | End: 2023-11-13 | Stop reason: ALTCHOICE

## 2023-11-11 RX ORDER — SOLIFENACIN SUCCINATE 5 MG/1
TABLET, FILM COATED ORAL
COMMUNITY
Start: 2020-09-11 | End: 2023-11-13 | Stop reason: ALTCHOICE

## 2023-11-12 NOTE — PROGRESS NOTES
Subjective   Patient ID: Brianda Waller is a 63 y.o. female who presents for her annual physical     She thinks and worries about things sometimes and can not fall asleep.  She has increased stressors at work.   She has some tightness in her chest at times     She is having difficulty digesting food at night.   She takes Tums prn but has bee using it more with the digestion issues   She is having difficulty drinking EtOH now also     She would like to have the influenza vaccine done today while here     HEALTH:  PAP 4/13, 8/17 - and 11/10/2022- and Q 5   Mammo 2/13 , 6/17 , 5/18, 7/19, 2/2022, 3/2023   BD 2/13 T+1.3 and 2/2022 T-1.0   Colon 7/16 + benign polyps 7- 10 years  Fibroscan 8/18  EKG 2012 , 12/15, 6/17, 8/18, 4/2020, 11/2023  Urine 8/18, 7/19 she was unable to provide an adequate sample   Hep C 7/18 -  Hep B ab +   FLu 2014, 12/15 , 10/18, 11/2021, 9/2022, 11/2023  TDAP 2010, 12/1/2021  Prevnar never   Pneumovax never   Shingrix 5/14/2022 and 8/6/2022  Moderna CVD 2/26/2021 and 3/26/2021 booster 11/13/2021, 11/6/2022   Ophth She sees Dr Hurt and last visit 11/2023. No glaucoma or MD. She has had LASIK OU in the past. She has corneal erosion left eye and using gel drops QID and tear mirela QHS both eyes. She has cataracts being monitored        Review of Systems  All systems negative except those listed in the HPI      Past Medical, Surgical, and Family History reviewed and updated in chart.  Reviewed all medications by prescribing practitioner or clinical pharmacist   (such as prescriptions, OTCs, herbal therapies and supplements) and documented in the medical record      Objective   Visit Vitals  /60 (BP Location: Left arm, Patient Position: Sitting)   Pulse 79   Temp 36.4 °C (97.6 °F) (Temporal)    Body mass index is 27.7 kg/m².     Physical Exam  Vitals reviewed.   Constitutional:       Appearance: Normal appearance.   HENT:      Head: Normocephalic.      Right Ear: Tympanic membrane, ear canal  and external ear normal.      Left Ear: Tympanic membrane, ear canal and external ear normal.      Nose: Nose normal.      Mouth/Throat:      Pharynx: Oropharynx is clear.   Eyes:      Conjunctiva/sclera: Conjunctivae normal.   Cardiovascular:      Rate and Rhythm: Normal rate and regular rhythm.      Pulses: Normal pulses.      Heart sounds: Normal heart sounds.   Pulmonary:      Effort: Pulmonary effort is normal.      Breath sounds: Normal breath sounds.   Chest:      Comments: Breast exam normal except dense bilaterally   Abdominal:      General: Bowel sounds are normal.      Palpations: Abdomen is soft.      Comments: Epigastric tenderness throughout    Musculoskeletal:         General: Normal range of motion.      Cervical back: Normal range of motion and neck supple.      Comments: Tightness to the left scapular area all the way to the left shoulder, full ROM, she has fatty deposit left neck area- soft and mobile, she has Dollinger hump    Skin:     General: Skin is warm.   Neurological:      General: No focal deficit present.      Mental Status: She is alert and oriented to person, place, and time.   Psychiatric:         Mood and Affect: Mood normal.         Behavior: Behavior normal.         Thought Content: Thought content normal.         Judgment: Judgment normal.       Assessment/Plan   Problem List Items Addressed This Visit       Abdominal pain    Corneal erosion of left eye    Depressive disorder    Fibromyalgia    Hyperlipidemia    Moderate obstructive sleep apnea    MORENO (nonalcoholic steatohepatitis)    Psoriasis    S/P LASIK surgery of both eyes     Other Visit Diagnoses       Healthcare maintenance    -  Primary    Relevant Orders    CBC    Comprehensive Metabolic Panel    Lipid Panel    TSH with reflex to Free T4 if abnormal    Visit for screening mammogram        Relevant Orders    BI mammo bilateral screening tomosynthesis            Annual physical completed  She brought her labs from work-  she was not fasting   Annual labs ordered - she is aware these are fasting labs     Health Maintenence completed  -  Discussed healthy diet and regular exercise.    -  Physical exam overall unremarkable. Immunizations reviewed and updated accordingly. Healthy lifestyle choices discussed (tobacco avoidance, appropriate alcohol use, avoidance of illicit substances).   -  Patient is wearing seatbelt.   -  Screening lab work ordered as indicated.    -  Age appropriate screening tests reviewed with patient.       Her weight/ BMI is in normal range if office, recommend she maintain   Her weight is 149 pounds with BMI at 27.70 IO 11/2023  She is exercising routinely and stays active throughout the day      HTN: Stable. She complains of having chest tightness with increased stressors.   She stopped Losartan. We will continue to monitor   EKG 11/2023 was normal, no LVH or strain pattern noted   Stress test 5/2020 was normal   She will continue to monitor her BP at home and call with elevated readings.      I have spent 15 min face to face with this patient discussing their cardiac risk and behavioral therapies of nutrition choices and exercise. We are trying to eliminate habits that are contributing to their cardiac risk.  We agreed on a plan of how they can reduce their current CV risk   The patient's 10 yr CV risk was estimated at 2.6 % 11/2023     Elevated lipids: Labs ordered and we will adjust if indicated  11/2023   Explained goal for LDL to be less than 100 and ideally less than 70   Continue rosuvastatin 5 mg daily   Discussed diet and exercise.   Recommend she add Metamucil 1 Tblsp daily      MORENO / Stage 1 fibrosis- Labs ordered and we will adjust if indicated  11/2023  Dx with RUQ u/s in 7/18  Fibroscan in 8/18 medium liver stiffness of 8.3. Stage 2 severe fatty liver changes.   US elastography 12/2021 Echogenic liver, likely representing a degree of hepatic steatosis. No focal lesions. Shear wave elastography  does not demonstrate significant hepatic fibrosis.   She saw nephrology in 8/2022 and told she has Stage I fibrosis   She has seen Dr Quinones and it is felt to be cholesterol related.   She saw Dr Bess at  10/2022 and felt fatty liver cholesterol related.   She is in a research protocol at   She needs to reduce her sugar intake and I have advised her on dietary changes.   Recommend she avoid EtOH   She is immune to Hep B 11/2022   Recommend she follow with GI and make an appt for EGD      LE edema:    Recommend wearing support stockings daily   Elevate legs throughout the day and for 45 minutes at night      Epigastric pain: DDx discussed for Sx. On exam: Epigastric tenderness throughout 11/2023. She complains of abdominal bloating   Continue OTC Nexium 20 mg daily for 2 weeks.  US elastography 12/2021 showed fatty liver but no cirrhosis   Recommend she monitor her foods. Discussed wilted salads and foods to avoid   Recommend she add 1 scoop of Metamucil daily      Depression/anxiety - She thinks and worries about things sometimes and can not fall asleep. She has increased stressors at work.   She hopes to retire in 3 years. She would like to retire sooner but her daughter is in college and she is helping her   Her daughter is in engineering for the first year and it has been very intense  Her daughter has decided she wants to do chemical engineering    Some of her stressors are work related    The house is empty with her daughter at college.   She is not dating. She does not have a chance to meet others any more.   Recommend she consider talking with a therapist.   The therapist she was seeing during the divorce is no longer here   She stopped Wellbutrin and Viibryd since she did not feel it helped with her Sx.  Recommend she begin 3 mcg Melatonin at night to help with sleep   She does go out with friends but they are    Continue Buspar 7.5 mg BID.       Fibromyalgia and fatigue - On exam: Tightness to  the left scapular area all the way to the left shoulder, full ROM, she has fatty deposit left neck area- soft and mobile, she has Dollinger hump 11/2023   Her pain has improved   Recommend and orders placed for US of soft tissue left arm for further evaluation  11/2023     Arthritis to distal phalanges:   She has pain and intermittent edema in her fingers   Keep hands moving.   She can try OTC pain relieving creams with Lidocaine for pain relief   She saw ortho and she has a splint to wear      Moderate RIK:   She saw Mirella Acosta on 6/19/2020 and diagnosed with Moderate RIK  Continue Flonase NS nightly   She would like to start with OAT eval with Dr Montoya and order in but did not go because of COVID pandemic      Psoriasis in the scalp:  She is using clobetasol propionate shampoo prn.      Vit D def: Levels 20 on labs in 11/2021   Continue OTC Vitamin D 200 UT daily     PAP normal on 11/10/2022.    Vaginal exam shows mild bladder prolapse, uterine prolapse noted, vaginal dryness and atrophy at vaginal opening 11/2022  She is not dating anyone and is not sexually active   Continue Vagifem 10 mcg twice weekly      Mammo was normal in 3/2023.   Breast exam normal except dense bilaterally 11/2023  BD 2/2022 T-1.0. Recommend calcium 600 mg BID and OTC Vitamin D 2000 UT daily and eat 2 servings of calcium enriched foods daily. Discussed importance of weight bearing exercises      Colon was + polyp in 2016 and 7- 10 years   No FmHx of colon cancer      Ophth:  She sees Dr Hurt and last visit 11/2023. No glaucoma or MD. She has had LASIK OU in the past. She has corneal erosion left eye and using gel drops QID and tear mirela QHS both eyes. She has cataracts being monitored      Hep C 7/18 -  Hep B ab +   FLu 2014, 12/15 , 10/18, 11/2021, 9/2022, 11/2023  TDAP 2010, 12/1/2021  Prevnar never   Pneumovax never   Shingrix 5/14/2022 and 8/6/2022  Moderna CVD 2/26/2021 and 3/26/2021 booster 11/13/2021, 11/6/2022     Some elements  in the chart were copied from Dr. Torres's last office visit with patient.   Notes have been updated where appropriate, and reflect my current medical decision making from today.     RTC in 6 months for follow up or sooner if needed   (CPE due 11/2024)     Scribe Attestation  By signing my name below, I, Lilia Brown , Scribe   attest that this documentation has been prepared under the direction and in the presence of Mila Torres MD.

## 2023-11-13 ENCOUNTER — OFFICE VISIT (OUTPATIENT)
Dept: PRIMARY CARE | Facility: CLINIC | Age: 63
End: 2023-11-13
Payer: COMMERCIAL

## 2023-11-13 VITALS
TEMPERATURE: 97.6 F | WEIGHT: 149 LBS | DIASTOLIC BLOOD PRESSURE: 60 MMHG | OXYGEN SATURATION: 99 % | HEART RATE: 79 BPM | SYSTOLIC BLOOD PRESSURE: 118 MMHG | BODY MASS INDEX: 27.42 KG/M2 | HEIGHT: 62 IN

## 2023-11-13 DIAGNOSIS — R22.1 MASS OF LEFT SIDE OF NECK: ICD-10-CM

## 2023-11-13 DIAGNOSIS — L40.9 PSORIASIS: ICD-10-CM

## 2023-11-13 DIAGNOSIS — G47.33 MODERATE OBSTRUCTIVE SLEEP APNEA: ICD-10-CM

## 2023-11-13 DIAGNOSIS — F32.A DEPRESSIVE DISORDER: ICD-10-CM

## 2023-11-13 DIAGNOSIS — E78.2 MIXED HYPERLIPIDEMIA: ICD-10-CM

## 2023-11-13 DIAGNOSIS — R10.13 EPIGASTRIC PAIN: ICD-10-CM

## 2023-11-13 DIAGNOSIS — Z12.31 VISIT FOR SCREENING MAMMOGRAM: ICD-10-CM

## 2023-11-13 DIAGNOSIS — Z98.890 S/P LASIK SURGERY OF BOTH EYES: ICD-10-CM

## 2023-11-13 DIAGNOSIS — K75.81 NASH (NONALCOHOLIC STEATOHEPATITIS): ICD-10-CM

## 2023-11-13 DIAGNOSIS — M79.7 FIBROMYALGIA: ICD-10-CM

## 2023-11-13 DIAGNOSIS — R07.9 CHEST PAIN AT REST: ICD-10-CM

## 2023-11-13 DIAGNOSIS — H16.002 CORNEAL EROSION OF LEFT EYE: ICD-10-CM

## 2023-11-13 DIAGNOSIS — Z00.00 HEALTHCARE MAINTENANCE: Primary | ICD-10-CM

## 2023-11-13 PROBLEM — H52.4 PRESBYOPIA: Status: ACTIVE | Noted: 2023-07-18

## 2023-11-13 PROBLEM — M19.049 LOCALIZED, PRIMARY OSTEOARTHRITIS OF HAND: Status: ACTIVE | Noted: 2023-04-14

## 2023-11-13 PROBLEM — H43.813 VITREOUS DEGENERATION OF BOTH EYES: Status: ACTIVE | Noted: 2023-07-18

## 2023-11-13 PROBLEM — H52.203 ASTIGMATISM OF BOTH EYES: Status: ACTIVE | Noted: 2023-07-18

## 2023-11-13 PROBLEM — J32.9 CHRONIC SINUSITIS: Status: RESOLVED | Noted: 2018-04-05 | Resolved: 2023-11-13

## 2023-11-13 PROBLEM — H52.02 HYPERMETROPIA OF LEFT EYE: Status: ACTIVE | Noted: 2023-07-18

## 2023-11-13 PROBLEM — H04.123 DRY EYE SYNDROME OF BOTH EYES: Status: ACTIVE | Noted: 2023-11-01

## 2023-11-13 PROBLEM — J06.9 ACUTE URI: Status: RESOLVED | Noted: 2023-05-08 | Resolved: 2023-11-13

## 2023-11-13 PROBLEM — H25.13 AGE-RELATED NUCLEAR CATARACT OF BOTH EYES: Status: ACTIVE | Noted: 2023-07-18

## 2023-11-13 PROBLEM — H40.003 GLAUCOMA SUSPECT OF BOTH EYES: Status: ACTIVE | Noted: 2023-07-18

## 2023-11-13 PROCEDURE — 1036F TOBACCO NON-USER: CPT | Performed by: INTERNAL MEDICINE

## 2023-11-13 PROCEDURE — 99396 PREV VISIT EST AGE 40-64: CPT | Performed by: INTERNAL MEDICINE

## 2023-11-13 PROCEDURE — 90686 IIV4 VACC NO PRSV 0.5 ML IM: CPT | Performed by: INTERNAL MEDICINE

## 2023-11-13 PROCEDURE — 90471 IMMUNIZATION ADMIN: CPT | Performed by: INTERNAL MEDICINE

## 2023-11-13 PROCEDURE — 93000 ELECTROCARDIOGRAM COMPLETE: CPT | Performed by: INTERNAL MEDICINE

## 2023-11-13 RX ORDER — ROSUVASTATIN CALCIUM 5 MG/1
5 TABLET, COATED ORAL DAILY
COMMUNITY
End: 2024-05-29 | Stop reason: SDUPTHER

## 2023-11-13 RX ORDER — AA/PROT/LYSINE/METHIO/VIT C/B6 50-12.5 MG
10 TABLET ORAL DAILY
COMMUNITY

## 2023-11-13 RX ORDER — CETIRIZINE HYDROCHLORIDE 10 MG/1
10 TABLET, CHEWABLE ORAL NIGHTLY
COMMUNITY

## 2023-11-13 ASSESSMENT — PATIENT HEALTH QUESTIONNAIRE - PHQ9
1. LITTLE INTEREST OR PLEASURE IN DOING THINGS: NOT AT ALL
2. FEELING DOWN, DEPRESSED OR HOPELESS: NOT AT ALL
SUM OF ALL RESPONSES TO PHQ9 QUESTIONS 1 AND 2: 0

## 2024-01-03 ENCOUNTER — TELEPHONE (OUTPATIENT)
Dept: PRIMARY CARE | Facility: CLINIC | Age: 64
End: 2024-01-03
Payer: COMMERCIAL

## 2024-01-03 NOTE — TELEPHONE ENCOUNTER
Pt called stating she tested neg for covid, pt called complaining of severe sore throat, cough occurring for 4 days.    Pharmacy=  [id number] in [place]       Please call pt at [phone]

## 2024-01-03 NOTE — TELEPHONE ENCOUNTER
Second phone call, she is feeling worse she wants to come in and see you for the upper respiratory infection.

## 2024-01-09 DIAGNOSIS — M79.7 FIBROMYALGIA: ICD-10-CM

## 2024-01-09 RX ORDER — BUSPIRONE HYDROCHLORIDE 7.5 MG/1
TABLET ORAL
Qty: 180 TABLET | Refills: 1 | Status: SHIPPED | OUTPATIENT
Start: 2024-01-09 | End: 2024-05-29 | Stop reason: SDUPTHER

## 2024-02-03 ENCOUNTER — TELEPHONE (OUTPATIENT)
Dept: PRIMARY CARE | Facility: CLINIC | Age: 64
End: 2024-02-03
Payer: COMMERCIAL

## 2024-02-03 DIAGNOSIS — U07.1 COVID: Primary | ICD-10-CM

## 2024-02-03 RX ORDER — NIRMATRELVIR AND RITONAVIR 300-100 MG
3 KIT ORAL 2 TIMES DAILY
Qty: 30 TABLET | Refills: 0 | Status: SHIPPED | OUTPATIENT
Start: 2024-02-03 | End: 2024-02-08

## 2024-02-03 NOTE — TELEPHONE ENCOUNTER
Patient paged me because she tested positive for COVID.  She has had a cough and congestion for 3 days, today developed a headache and fever.  Rapid COVID test was positive.  She does have a history of MORENO and stage 1 fibrosis.  She would like treatment with Paxlovid.  Prescription for Paxlovid was sent to her pharmacy.  I warned about potential side effects of dysgeusia and diarrhea.  I advised her to reduce her BuSpar dose to once daily.  I advised her to hold Crestor for 10 days.  She does not take alprazolam.  She was advised to self isolate for 5 days from the onset of symptoms.  She was advised to wear a high-quality mask for the next 5 days.  She was advised she can use Tylenol as needed for headache and fever.  She was advised to use Cepacol lozenges for cough.  She was advised good hydration.  She will call back with any further problems.  Dr. Rodrigues

## 2024-03-04 ENCOUNTER — LAB (OUTPATIENT)
Dept: LAB | Facility: LAB | Age: 64
End: 2024-03-04
Payer: COMMERCIAL

## 2024-03-04 DIAGNOSIS — Z00.00 HEALTHCARE MAINTENANCE: ICD-10-CM

## 2024-03-04 LAB
ALBUMIN SERPL BCP-MCNC: 4.4 G/DL (ref 3.4–5)
ALP SERPL-CCNC: 63 U/L (ref 33–136)
ALT SERPL W P-5'-P-CCNC: 16 U/L (ref 7–45)
ANION GAP SERPL CALC-SCNC: 11 MMOL/L (ref 10–20)
AST SERPL W P-5'-P-CCNC: 16 U/L (ref 9–39)
BILIRUB SERPL-MCNC: 0.4 MG/DL (ref 0–1.2)
BUN SERPL-MCNC: 10 MG/DL (ref 6–23)
CALCIUM SERPL-MCNC: 9.4 MG/DL (ref 8.6–10.3)
CHLORIDE SERPL-SCNC: 104 MMOL/L (ref 98–107)
CHOLEST SERPL-MCNC: 215 MG/DL (ref 0–199)
CHOLESTEROL/HDL RATIO: 3.1
CO2 SERPL-SCNC: 29 MMOL/L (ref 21–32)
CREAT SERPL-MCNC: 0.64 MG/DL (ref 0.5–1.05)
EGFRCR SERPLBLD CKD-EPI 2021: >90 ML/MIN/1.73M*2
ERYTHROCYTE [DISTWIDTH] IN BLOOD BY AUTOMATED COUNT: 12.1 % (ref 11.5–14.5)
GLUCOSE SERPL-MCNC: 92 MG/DL (ref 74–99)
HCT VFR BLD AUTO: 38.1 % (ref 36–46)
HDLC SERPL-MCNC: 70.2 MG/DL
HGB BLD-MCNC: 12.7 G/DL (ref 12–16)
LDLC SERPL CALC-MCNC: 104 MG/DL
MCH RBC QN AUTO: 32.2 PG (ref 26–34)
MCHC RBC AUTO-ENTMCNC: 33.3 G/DL (ref 32–36)
MCV RBC AUTO: 97 FL (ref 80–100)
NON HDL CHOLESTEROL: 145 MG/DL (ref 0–149)
NRBC BLD-RTO: 0 /100 WBCS (ref 0–0)
PLATELET # BLD AUTO: 279 X10*3/UL (ref 150–450)
POTASSIUM SERPL-SCNC: 3.8 MMOL/L (ref 3.5–5.3)
PROT SERPL-MCNC: 6.9 G/DL (ref 6.4–8.2)
RBC # BLD AUTO: 3.95 X10*6/UL (ref 4–5.2)
SODIUM SERPL-SCNC: 140 MMOL/L (ref 136–145)
TRIGL SERPL-MCNC: 203 MG/DL (ref 0–149)
TSH SERPL-ACNC: 2.53 MIU/L (ref 0.44–3.98)
VLDL: 41 MG/DL (ref 0–40)
WBC # BLD AUTO: 5 X10*3/UL (ref 4.4–11.3)

## 2024-03-04 PROCEDURE — 36415 COLL VENOUS BLD VENIPUNCTURE: CPT

## 2024-03-04 PROCEDURE — 80053 COMPREHEN METABOLIC PANEL: CPT

## 2024-03-04 PROCEDURE — 84443 ASSAY THYROID STIM HORMONE: CPT

## 2024-03-04 PROCEDURE — 80061 LIPID PANEL: CPT

## 2024-03-04 PROCEDURE — 85027 COMPLETE CBC AUTOMATED: CPT

## 2024-03-04 NOTE — RESULT ENCOUNTER NOTE
Sandeep Lamar-  The cholesterol is good range   Thyroid is good range   Rest of the labs are good range

## 2024-03-08 ENCOUNTER — TELEPHONE (OUTPATIENT)
Dept: PRIMARY CARE | Facility: CLINIC | Age: 64
End: 2024-03-08
Payer: COMMERCIAL

## 2024-03-08 DIAGNOSIS — E78.2 MIXED HYPERLIPIDEMIA: Primary | ICD-10-CM

## 2024-03-08 NOTE — TELEPHONE ENCOUNTER
Ok for [id number]  She had a question on rosuvastatin, she did not take it for a while, she had to stop when she had covid and did not start back up, her cholesterol level is good on blood work, should she resume her rosuvastatin?

## 2024-05-15 NOTE — PROGRESS NOTES
Subjective   Patient ID: Brianda Waller is a 63 y.o. female who presents for her 6 month follow up multiple medical conditions    HEALTH:  PAP 4/13, 8/17 - and 11/10/2022- and Q 5   Mammo 2/13 , 6/17 , 5/18, 7/19, 2/2022, 3/2023   BD 2/13 T+1.3 and 2/2022 T-1.0   Colon 7/16 + benign polyps 7- 10 years  Fibroscan 8/18  EKG 2012 , 12/15, 6/17, 8/18, 4/2020, 11/2023  Urine 8/18, 7/19 she was unable to provide an adequate sample   Hep C 7/18 -  Hep B ab +   FLu 2014, 12/15 , 10/18, 11/2021, 9/2022, 11/2023  TDAP 2010, 12/1/2021  Prevnar never   Pneumovax never   Shingrix 5/14/2022 and 8/6/2022  Moderna CVD 2/26/2021 and 3/26/2021 booster 11/13/2021, 11/6/2022   Ophth She sees Dr Hurt and last visit 11/2023. No glaucoma or MD. She has had LASIK OU in the past. She has corneal erosion left eye and using gel drops QID and tear mirela QHS both eyes. She has cataracts being monitored     HPI     Review of Systems    Objective   There were no vitals taken for this visit.    Physical Exam    Assessment/Plan   {Assess/[email or web address]:24898}     Follow up completed  Reviewed her labs from 3/2024     Her weight/ BMI is in normal range if office, recommend she maintain   Her weight is 149 pounds with BMI at 27.70 IO 11/2023  She is exercising routinely and stays active throughout the day      HTN: Stable. She complains of having chest tightness with increased stressors.   She stopped Losartan. We will continue to monitor   EKG 11/2023 was normal, no LVH or strain pattern noted   Stress test 5/2020 was normal   She will continue to monitor her BP at home and call with elevated readings.      I have spent 15 min face to face with this patient discussing their cardiac risk and behavioral therapies of nutrition choices and exercise. We are trying to eliminate habits that are contributing to their cardiac risk.  We agreed on a plan of how they can reduce their current CV risk   The patient's 10 yr CV risk was estimated at 2.6 %  11/2023     Elevated lipids: Labs ordered and we will adjust if indicated  11/2023   Explained goal for LDL to be less than 100 and ideally less than 70   Continue rosuvastatin 5 mg daily   Discussed diet and exercise.   Recommend she add Metamucil 1 Tblsp daily      MORENO / Stage 1 fibrosis- Labs ordered and we will adjust if indicated  11/2023  Dx with RUQ u/s in 7/18  Fibroscan in 8/18 medium liver stiffness of 8.3. Stage 2 severe fatty liver changes.   US elastography 12/2021 Echogenic liver, likely representing a degree of hepatic steatosis. No focal lesions. Shear wave elastography does not demonstrate significant hepatic fibrosis.   She saw nephrology in 8/2022 and told she has Stage I fibrosis   She has seen Dr Quinones and it is felt to be cholesterol related.   She saw Dr Bess at  10/2022 and felt fatty liver cholesterol related.   She is in a research protocol at   She needs to reduce her sugar intake and I have advised her on dietary changes.   Recommend she avoid EtOH   She is immune to Hep B 11/2022   Recommend she follow with GI and make an appt for EGD      LE edema:    Recommend wearing support stockings daily   Elevate legs throughout the day and for 45 minutes at night      Epigastric pain: DDx discussed for Sx. On exam: Epigastric tenderness throughout 11/2023. She complains of abdominal bloating   Continue OTC Nexium 20 mg daily for 2 weeks.  US elastography 12/2021 showed fatty liver but no cirrhosis   Recommend she monitor her foods. Discussed wilted salads and foods to avoid   Recommend she add 1 scoop of Metamucil daily      Depression/anxiety - She thinks and worries about things sometimes and can not fall asleep. She has increased stressors at work.   She hopes to retire in 3 years. She would like to retire sooner but her daughter is in college and she is helping her   Her daughter is in engineering for the first year and it has been very intense  Her daughter has decided she wants to  do chemical engineering    Some of her stressors are work related    The house is empty with her daughter at college.   She is not dating. She does not have a chance to meet others any more.   Recommend she consider talking with a therapist.   The therapist she was seeing during the divorce is no longer here   She stopped Wellbutrin and Viibryd since she did not feel it helped with her Sx.  Recommend she begin 3 mcg Melatonin at night to help with sleep   She does go out with friends but they are    Continue Buspar 7.5 mg BID.       Fibromyalgia and fatigue - On exam: Tightness to the left scapular area all the way to the left shoulder, full ROM, she has fatty deposit left neck area- soft and mobile, she has Dollinger hump 11/2023   Her pain has improved   Recommend and orders placed for US of soft tissue left arm for further evaluation  11/2023     Arthritis to distal phalanges:   She has pain and intermittent edema in her fingers   Keep hands moving.   She can try OTC pain relieving creams with Lidocaine for pain relief   She saw ortho and she has a splint to wear      Moderate RIK:   She saw Mirella Acosta on 6/19/2020 and diagnosed with Moderate RIK  Continue Flonase NS nightly   She would like to start with OAT eval with Dr Montoya and order in but did not go because of COVID pandemic      Psoriasis in the scalp:  She is using clobetasol propionate shampoo prn.      Vit D def: Levels 20 on labs in 11/2021   Continue OTC Vitamin D 200 UT daily     PAP normal on 11/10/2022.    Vaginal exam shows mild bladder prolapse, uterine prolapse noted, vaginal dryness and atrophy at vaginal opening 11/2022  She is not dating anyone and is not sexually active   Continue Vagifem 10 mcg twice weekly      Mammo was normal in 3/2023.   Breast exam normal except dense bilaterally 11/2023  BD 2/2022 T-1.0. Recommend calcium 600 mg BID and OTC Vitamin D 2000 UT daily and eat 2 servings of calcium enriched foods daily. Discussed  importance of weight bearing exercises      Colon was + polyp in 2016 and 7- 10 years   No FmHx of colon cancer      Ophth:  She sees Dr Hurt and last visit 11/2023. No glaucoma or MD. She has had LASIK OU in the past. She has corneal erosion left eye and using gel drops QID and tear mirela QHS both eyes. She has cataracts being monitored      Hep C 7/18 -  Hep B ab +   FLu 2014, 12/15 , 10/18, 11/2021, 9/2022, 11/2023  TDAP 2010, 12/1/2021  Prevnar never   Pneumovax never   Shingrix 5/14/2022 and 8/6/2022  Moderna CVD 2/26/2021 and 3/26/2021 booster 11/13/2021, 11/6/2022      Some elements in the chart were copied from Dr. Torres's last office visit with patient.   Notes have been updated where appropriate, and reflect my current medical decision making from today.     RTC in 6 months for follow up or sooner if needed   (CPE due 11/2024)     Scribe Attestation  By signing my name below, I, Lilia Brown , Scribe   attest that this documentation has been prepared under the direction and in the presence of Mila Torres MD.

## 2024-05-16 ENCOUNTER — APPOINTMENT (OUTPATIENT)
Dept: PRIMARY CARE | Facility: CLINIC | Age: 64
End: 2024-05-16
Payer: COMMERCIAL

## 2024-05-21 ENCOUNTER — HOSPITAL ENCOUNTER (OUTPATIENT)
Dept: RADIOLOGY | Facility: CLINIC | Age: 64
Discharge: HOME | End: 2024-05-21
Payer: COMMERCIAL

## 2024-05-21 DIAGNOSIS — E78.2 MIXED HYPERLIPIDEMIA: ICD-10-CM

## 2024-05-21 PROCEDURE — 75571 CT HRT W/O DYE W/CA TEST: CPT

## 2024-05-21 NOTE — RESULT ENCOUNTER NOTE
Hi great news the cardiac score is zero so no hard plaque in the coronary arteries and low risk of cardiac event in the next 10 yrs   the CT of the lungs is also unremarkable for aneurysm or lung nodules

## 2024-05-28 NOTE — PROGRESS NOTES
Subjective   Patient ID: Brianda Waller is a 63 y.o. female who presents for her 6 month follow up multiple medical conditions    She burned her right arm Saturday 5/25/2024 on hot steam   She put small amounts of neosporin on the area since it happened     She is taking Buspar once a day, it helps get her through the day   She complains of chest pressure intermittently  Her friend of 25 years just suddenly quit talking to her and will not return her call     She stopped rosuvastatin. She thought she was told to stop     She has been taking Mucinex D for a long time to help her breath during allergies   The minute she stops Mucinex she gets congested     HEALTH:  PAP 4/13, 8/17, 11/22- and Q 5   Mammo 2/13, 6/17, 5/18, 7/19, 2/22, 3/23   BD 2/13 T+1.3, 2/22 T-1.0   Colon 7/16 benign polyps 7- 10 years  Fibroscan 8/18  Ca cardiac score 5/24 Zero    EKG 2012, 12/15, 6/17, 8/18, 4/20, 11/23  Urine 8/18, 7/19 she was unable to provide an adequate sample   Hep C 7/18 -  Hep B ab +   FLu 10/18, 11/21, 9/22, 11/23  TDAP 2010, 12/1/2021  Prevnar never   Pneumovax never   Shingrix 5/14/2022 and 8/6/2022  Moderna CVD 2/21 and 3/21 booster 11/21, 11/22, 12/23  Ophth She sees Dr Hurt and last visit 11/2023. No glaucoma or MD. She has had LASIK OU in the past. She has corneal erosion left eye and using gel drops QID and tear mirela QHS both eyes. She has cataracts being monitored        Review of Systems  All systems negative except those listed in the HPI      Objective   Visit Vitals  /70 (BP Location: Left arm, Patient Position: Sitting)   Pulse 75   Temp 36.1 °C (97 °F)    Body mass index is 26.45 kg/m².     Physical Exam  Vitals reviewed.   Constitutional:       Appearance: Normal appearance.   HENT:      Head: Normocephalic.      Right Ear: Tympanic membrane, ear canal and external ear normal.      Left Ear: Tympanic membrane, ear canal and external ear normal.      Nose: Nose normal.      Mouth/Throat:      Pharynx:  Oropharynx is clear.   Eyes:      Conjunctiva/sclera: Conjunctivae normal.   Cardiovascular:      Rate and Rhythm: Normal rate and regular rhythm.      Pulses: Normal pulses.      Heart sounds: Normal heart sounds.   Pulmonary:      Effort: Pulmonary effort is normal.      Breath sounds: Normal breath sounds.   Abdominal:      General: Bowel sounds are normal.      Palpations: Abdomen is soft.   Musculoskeletal:         General: Normal range of motion.      Cervical back: Normal range of motion and neck supple.   Skin:     General: Skin is warm.      Comments: Burn right arm: healing, no blistering, no infection   Neurological:      General: No focal deficit present.      Mental Status: She is alert and oriented to person, place, and time.   Psychiatric:         Mood and Affect: Mood normal.         Behavior: Behavior normal.         Thought Content: Thought content normal.         Judgment: Judgment normal.       Assessment/Plan   Problem List Items Addressed This Visit       Age-related nuclear cataract of both eyes    Fibromyalgia    Relevant Medications    busPIRone (Buspar) 7.5 mg tablet    Hyperlipidemia - Primary    Relevant Medications    rosuvastatin (Crestor) 5 mg tablet    Uterine prolapse    Vaginal atrophy     Other Visit Diagnoses       Superficial burn of right upper arm, initial encounter        Relevant Medications    silver sulfADIAZINE (Silvadene) 1 % cream            Follow up completed   Reviewed her labs from 3/2024     She is  with 1 daughter. She denies previous history of tobacco use  She works full time as a school psychologist     She has been taking Mucinex D for a long time to help her breath during allergies   The minute she stops Mucinex she gets congested   Recommend taking an antihistamine instead of Mucinex for allergies  She can wean herself off Mucinex. Explained how to wean off Mucinex     Burn right arm: healing, no blistering, no infection 5/2024  She burned her right  arm Saturday 5/25/2024 on hot steam   She put small amounts of neosporin on the area since it happened   Prescription sent in for silvadene cream to apply to burns prn     Her weight/ BMI is in normal range if office, recommend she maintain   Her weight is 140 pounds with BMI at 26.45 IO 5/2024  She is exercising routinely and stays active throughout the day      HTN: Stable.    She stopped Losartan. We will continue to monitor   EKG 11/2023 was normal, no LVH or strain pattern noted   Stress test 5/2020 was normal   She will continue to monitor her BP at home and call with elevated readings.      I have spent 15 min face to face with this patient discussing their cardiac risk   We discussed the patients cardiovascular risk. If needed, lifestyle modifications recommended including: behavioral therapies of nutrition choices, exercise and eliminate habits that are contributing to their cardiac risk. We agreed to a plan to decrease his cardiovascular risks. Discussed ASA. Reviewed Guidelines and approved recommendations made to patient.   The patient's 10 yr CV risk was estimated at  3.2 % 5/2024      Elevated lipids:  and HDL 70 on labs in 3/2024. She stopped rosuvastatin in 2/2024 when she had to take Paxlovid for COVID. She will restart rosuvastatin 5 mg daily   Explained goal for LDL to be less than 100 and ideally less than 70   Continue rosuvastatin 5 mg daily   Ca cardiac score 5/24 Zero   Recommend she look into a plant based/ whole foods diet      MORENO / Stage 1 fibrosis- Labs stable  3/2024   Dx with RUQ u/s in 7/18  She saw nephrology in 8/2022 and told she has Stage I fibrosis   She has seen Dr Quinones and it is felt to be cholesterol related.   She saw Dr Bess at  10/2022 and felt fatty liver cholesterol related.   Fibroscan in 8/18 medium liver stiffness of 8.3. Stage 2 severe fatty liver changes.   US elastography 12/2021 Echogenic liver, likely representing a degree of hepatic steatosis. No  focal lesions. Shear wave elastography does not demonstrate significant hepatic fibrosis.    She is in a research protocol at   Recommend she avoid EtOH   She is immune to Hep B 11/2022   Recommend she follow with GI and make an appt for EGD   Recommend she look into a plant based/ whole foods diet      LE edema:    Recommend wearing support stockings daily   Elevate legs throughout the day and for 45 minutes at night      Epigastric pain: DDx discussed for Sx.    Continue OTC Nexium 20 mg daily for 2 weeks.  US elastography 12/2021 showed fatty liver but no cirrhosis   Recommend she add 1 scoop of Metamucil daily      Depression/anxiety - Her friend of 25 years just suddenly quit talking to her and will not return her call.   She would like to retire sooner but her daughter is in college and she is helping her   Her daughter has decided she wants to do chemical engineering    Some of her stressors are work related    She is not dating. She does not have a chance to meet others any more.   Recommend she consider talking with a therapist.   The therapist she was seeing during the divorce is no longer here   She stopped Wellbutrin and Viibryd since she did not feel it helped with her Sx.  Recommend she begin 3 mcg Melatonin at night to help with sleep   She does go out with friends but they are    Continue Buspar 7.5 mg BID.       Fibromyalgia and fatigue -   She has a Dollinger hump   Her pain has improved   Recommend and orders placed for US of soft tissue left arm for further evaluation  11/2023- she has not done the US yet   Recommend she increase her activity levels. She has to make slow mindful movements   Recommend she consider acupuncture or massage therapy      Arthritis to distal phalanges:   She has pain and intermittent edema in her fingers   Keep hands moving.   She can try OTC pain relieving creams with Lidocaine for pain relief   She saw ortho and she has a splint to wear      Moderate RIK:    She saw Mirella Acosta on 6/19/2020 and diagnosed with Moderate RIK  Continue Flonase NS nightly   She would like to start with OAT eval with Dr Montoya and order in but did not go because of COVID pandemic      Psoriasis in the scalp:  She is using clobetasol propionate shampoo prn.      Vit D def: Levels 20 on labs in 11/2021   Continue OTC Vitamin D 200 UT daily     PAP normal on 11/10/2022.    Vaginal exam shows mild bladder prolapse, uterine prolapse noted, vaginal dryness and atrophy at vaginal opening 11/2022  She is not dating anyone and is not sexually active   Continue Vagifem 10 mcg twice weekly      Mammo was normal in 3/2023.   Breast exam normal except dense bilaterally 11/2023  BD 2/2022 T-1.0. Continue OTC calcium 600 mg BID and OTC Vitamin D 2000 UT daily and eat 2 servings of calcium enriched foods daily.   Discussed importance of weight bearing exercises      Colon was + polyp in 2016 and 7- 10 years   No FmHx of colon cancer      Ophth:  She sees Dr Hurt and last visit 11/2023. No glaucoma or MD. She has had LASIK OU in the past. She has corneal erosion left eye and using gel drops QID and tear mirela QHS both eyes. She has cataracts being monitored      Hep C 7/18 -  Hep B ab +   FLu 10/18, 11/21, 9/22, 11/23  TDAP 2010, 12/1/2021  Prevnar never   Pneumovax never   Shingrix 5/14/2022 and 8/6/2022  Moderna CVD 2/21 and 3/21 booster 11/21, 11/22, 12/23      Some elements in the chart were copied from Dr. Torres's last office visit with patient.   Notes have been updated where appropriate, and reflect my current medical decision making from today.     RTC in 6 months for CPE or sooner if needed   (CPE due 11/2024)     Scribe Attestation  By signing my name below, I, Lilia Dasilva , Scribe   attest that this documentation has been prepared under the direction and in the presence of Mila Torres MD.

## 2024-05-29 ENCOUNTER — OFFICE VISIT (OUTPATIENT)
Dept: PRIMARY CARE | Facility: CLINIC | Age: 64
End: 2024-05-29
Payer: COMMERCIAL

## 2024-05-29 VITALS
HEART RATE: 75 BPM | SYSTOLIC BLOOD PRESSURE: 124 MMHG | WEIGHT: 140 LBS | TEMPERATURE: 97 F | DIASTOLIC BLOOD PRESSURE: 70 MMHG | OXYGEN SATURATION: 98 % | BODY MASS INDEX: 26.43 KG/M2 | HEIGHT: 61 IN

## 2024-05-29 DIAGNOSIS — T22.131A SUPERFICIAL BURN OF RIGHT UPPER ARM, INITIAL ENCOUNTER: ICD-10-CM

## 2024-05-29 DIAGNOSIS — E78.2 MIXED HYPERLIPIDEMIA: Primary | ICD-10-CM

## 2024-05-29 DIAGNOSIS — N81.4 UTERINE PROLAPSE: ICD-10-CM

## 2024-05-29 DIAGNOSIS — M79.7 FIBROMYALGIA: ICD-10-CM

## 2024-05-29 DIAGNOSIS — N95.2 VAGINAL ATROPHY: ICD-10-CM

## 2024-05-29 DIAGNOSIS — R35.0 URINARY FREQUENCY: ICD-10-CM

## 2024-05-29 DIAGNOSIS — H25.13 AGE-RELATED NUCLEAR CATARACT OF BOTH EYES: ICD-10-CM

## 2024-05-29 PROCEDURE — 99214 OFFICE O/P EST MOD 30 MIN: CPT | Performed by: INTERNAL MEDICINE

## 2024-05-29 PROCEDURE — 1036F TOBACCO NON-USER: CPT | Performed by: INTERNAL MEDICINE

## 2024-05-29 RX ORDER — BUSPIRONE HYDROCHLORIDE 7.5 MG/1
TABLET ORAL
Qty: 180 TABLET | Refills: 1 | Status: SHIPPED | OUTPATIENT
Start: 2024-05-29

## 2024-05-29 RX ORDER — ROSUVASTATIN CALCIUM 5 MG/1
5 TABLET, COATED ORAL DAILY
Qty: 90 TABLET | Refills: 3 | Status: SHIPPED | OUTPATIENT
Start: 2024-05-29 | End: 2025-05-29

## 2024-05-29 RX ORDER — SILVER SULFADIAZINE 10 G/1000G
CREAM TOPICAL
Qty: 20 G | Refills: 0 | Status: SHIPPED | OUTPATIENT
Start: 2024-05-29 | End: 2024-07-28

## 2024-05-29 RX ORDER — ESTRADIOL 10 UG/1
10 INSERT VAGINAL 2 TIMES WEEKLY
Qty: 24 TABLET | Refills: 3 | Status: SHIPPED | OUTPATIENT
Start: 2024-05-30 | End: 2025-05-30

## 2024-05-29 ASSESSMENT — PATIENT HEALTH QUESTIONNAIRE - PHQ9
1. LITTLE INTEREST OR PLEASURE IN DOING THINGS: NOT AT ALL
SUM OF ALL RESPONSES TO PHQ9 QUESTIONS 1 AND 2: 0
2. FEELING DOWN, DEPRESSED OR HOPELESS: NOT AT ALL

## 2024-07-22 ENCOUNTER — APPOINTMENT (OUTPATIENT)
Dept: PRIMARY CARE | Facility: CLINIC | Age: 64
End: 2024-07-22
Payer: COMMERCIAL

## 2024-07-22 VITALS
DIASTOLIC BLOOD PRESSURE: 60 MMHG | OXYGEN SATURATION: 97 % | BODY MASS INDEX: 26.81 KG/M2 | HEIGHT: 61 IN | TEMPERATURE: 97 F | HEART RATE: 74 BPM | SYSTOLIC BLOOD PRESSURE: 112 MMHG | WEIGHT: 142 LBS

## 2024-07-22 DIAGNOSIS — M54.50 ACUTE LEFT-SIDED LOW BACK PAIN WITHOUT SCIATICA: Primary | ICD-10-CM

## 2024-07-22 PROCEDURE — 3008F BODY MASS INDEX DOCD: CPT | Performed by: NURSE PRACTITIONER

## 2024-07-22 PROCEDURE — 99213 OFFICE O/P EST LOW 20 MIN: CPT | Performed by: NURSE PRACTITIONER

## 2024-07-22 PROCEDURE — 1036F TOBACCO NON-USER: CPT | Performed by: NURSE PRACTITIONER

## 2024-07-22 RX ORDER — METHYLPREDNISOLONE 4 MG/1
TABLET ORAL
Qty: 21 TABLET | Refills: 0 | Status: SHIPPED | OUTPATIENT
Start: 2024-07-22 | End: 2024-07-29

## 2024-07-22 ASSESSMENT — ENCOUNTER SYMPTOMS
SORE THROAT: 0
COUGH: 0
SINUS PRESSURE: 0
HEADACHES: 0

## 2024-07-22 NOTE — PROGRESS NOTES
"Subjective   Patient ID: Brianda Waller is a 63 y.o. female who presents for Sinusitis (Sinus drainage, fluid in ears. ).    Feeling better today after suffering from congestion, cough, headaches and drainage for 13 days.    She also has acute back pain flared up. Denies any new injury. She doesn't have any radiation of pain. No bowel or bladder changes. No numbness, weakness or tingling.     Sinusitis  This is a new problem. The current episode started 1 to 4 weeks ago. Pertinent negatives include no congestion, coughing, ear pain, headaches, sinus pressure or sore throat. (Throat feels extra mucous.) Treatments tried: mucinex D.        Review of Systems   HENT:  Negative for congestion, ear pain, sinus pressure and sore throat.    Respiratory:  Negative for cough.    Neurological:  Negative for headaches.     otherwise negative aside from what was mentioned above in HPI.    Objective   /60   Pulse 74   Temp 36.1 °C (97 °F)   Ht 1.549 m (5' 1\")   Wt 64.4 kg (142 lb)   SpO2 97%   BMI 26.83 kg/m²     Physical Exam  Constitutional:       Appearance: Normal appearance.   HENT:      Right Ear: Tympanic membrane normal.      Left Ear: Tympanic membrane normal.   Eyes:      Conjunctiva/sclera: Conjunctivae normal.   Cardiovascular:      Rate and Rhythm: Normal rate and regular rhythm.   Pulmonary:      Effort: Pulmonary effort is normal.      Breath sounds: Normal breath sounds.   Abdominal:      Palpations: Abdomen is soft.   Neurological:      Mental Status: She is alert.   Psychiatric:         Mood and Affect: Mood normal.         Thought Content: Thought content normal.         Assessment/Plan   Problem List Items Addressed This Visit    None  Visit Diagnoses         Codes    Acute left-sided low back pain without sciatica    -  Primary M54.50    Relevant Medications    methylPREDNISolone (Medrol Dospak) 4 mg tablets               "

## 2024-10-03 DIAGNOSIS — M79.7 FIBROMYALGIA: ICD-10-CM

## 2024-10-03 RX ORDER — BUSPIRONE HYDROCHLORIDE 7.5 MG/1
TABLET ORAL
Qty: 180 TABLET | Refills: 1 | Status: SHIPPED | OUTPATIENT
Start: 2024-10-03

## 2024-11-24 NOTE — PROGRESS NOTES
Subjective   Patient ID: Brianda Waller is a 64 y.o. female who presents for her annual physical     She would like the influenza vaccine while she is in the office today     She complains of having CP every morning but feels it is due to her anxiety   The CP happens in the morning when she wakes and it returns in the late afternoon   She is retiring in 6/25, she has been at her job for 34 years   She admits her job is very stressful and she is exhausted   She has 150 days of sick leave left to take         HEALTH:  PAP 4/13, 8/17, 11/22- and Q 5   Mammo 2/13, 6/17, 5/18, 7/19, 2/22, 3/23, ordered 11/24  BD 2/13 T+1.3, 2/22 T-1.0   Colon 7/16 benign polyps 7- 10 years-  repeat 2025   Ca cardiac score 5/24 Zero    EKG 2012, 12/15, 6/17, 8/18, 4/20, 11/23  Urine 8/18,    Hep C 7/18 -  She is immune to Hep B 11/22   FLu 10/18, 11/21, 9/22, 11/23, 11/24   TDAP 2010, 12/1/2021  Prevnar never   Pneumovax never   Shingrix 5/14/2022 and 8/6/2022  Moderna CVD 2/21 and 3/21 booster 11/21, 11/22, 12/23  Ophth She saw Dr Hurt in 7/24. She has had LASIK OU in the past. She has bilat cataracts being monitored and is a glaucoma suspect bilaterally. She has dry eyes and PVD bilaterally. No MD       Review of Systems  All systems negative except those listed in the HPI      Past Medical, Surgical, and Family History reviewed and updated in chart.  Reviewed all medications by prescribing practitioner or clinical pharmacist   (such as prescriptions, OTCs, herbal therapies and supplements) and documented in the medical record       Objective   Visit Vitals  /60 (BP Location: Left arm, Patient Position: Sitting, BP Cuff Size: Adult)   Pulse 90   Temp 36.3 °C (97.4 °F)    Body mass index is 26.21 kg/m².      Physical Exam  Vitals reviewed.   Constitutional:       Appearance: Normal appearance.   HENT:      Head: Normocephalic.      Right Ear: Tympanic membrane, ear canal and external ear normal.      Left Ear: Tympanic membrane,  ear canal and external ear normal.      Nose: Nose normal.      Mouth/Throat:      Pharynx: Oropharynx is clear.   Eyes:      Conjunctiva/sclera: Conjunctivae normal.   Cardiovascular:      Rate and Rhythm: Normal rate and regular rhythm.      Pulses: Normal pulses.      Heart sounds: Normal heart sounds.   Pulmonary:      Effort: Pulmonary effort is normal.      Breath sounds: Normal breath sounds.   Chest:      Comments: Breast exam normal except dense bilaterally   Abdominal:      General: Bowel sounds are normal.      Palpations: Abdomen is soft.   Musculoskeletal:         General: Normal range of motion.      Cervical back: Normal range of motion and neck supple.   Skin:     General: Skin is warm.   Neurological:      General: No focal deficit present.      Mental Status: She is alert and oriented to person, place, and time.   Psychiatric:         Mood and Affect: Mood normal.         Behavior: Behavior normal.         Thought Content: Thought content normal.         Judgment: Judgment normal.       Assessment/Plan   Problem List Items Addressed This Visit       Acute anxiety    Age-related nuclear cataract of both eyes    Female bladder prolapse    Fibromyalgia    Glaucoma suspect of both eyes    Hyperlipidemia    Moderate obstructive sleep apnea    MORENO (nonalcoholic steatohepatitis)    Psoriasis     Other Visit Diagnoses       Healthcare maintenance    -  Primary    Visit for screening mammogram        Relevant Orders    BI mammo bilateral screening tomosynthesis            Annual physical completed  Labs ordered     Health Maintenence completed  -  Discussed healthy diet and regular exercise.    -  Physical exam overall unremarkable. Immunizations reviewed and updated accordingly. Healthy lifestyle choices discussed (tobacco avoidance, appropriate alcohol use, avoidance of illicit substances).   -  Patient is wearing seatbelt.   -  Screening lab work ordered as indicated.    -  Age appropriate screening  tests reviewed with patient.       She is  with 1 daughter. She denies previous history of tobacco use  She works full time as a school psychologist     CP due to increased anxiety:   She complains of having CP every morning but feels it is due to her anxiety   The CP happen in the morning when she wakes and it returns in the late afternoon   She is retiring in 6/25, she has been at her job for 34 years   She admits her job is very stressful and she is exhausted    She has 150 days of sick leave left to take   EKG 11/2024 normal, no LVH or strain pattern noted    Long discussion on decreasing her stressors. We talked about the possibly of retiring before 6/2025. We discussed the possibility of using FMLA prn  She is systemically having side effects due to her increased stressors   Recommend she get through the Holidays and take the days off to consider her options. She needs to allow herself time to make this decision   Recommend she speak with a therapist. I have a list of local therapist listed in the 'This Page' portion of the EMR 11/24. Recommend she check her insurance for coverage     Her weight/ BMI is in normal range if office, recommend she maintain   Her weight is 141 pounds with BMI at 26.21 IO 11/2024  She is exercising routinely and stays active throughout the day      HTN: BP Stable.    She is no longer taking Losartan. We will continue to monitor   EKG 11/2024 normal, no LVH or strain pattern noted   Stress test 5/2020 was normal   She will continue to monitor her BP at home and call with elevated readings.      I have spent 15 min face to face with this patient discussing their cardiac risk   We discussed the patients cardiovascular risk. If needed, lifestyle modifications recommended including: behavioral therapies of nutrition choices, exercise and eliminate habits that are contributing to their cardiac risk. We agreed to a plan to decrease his cardiovascular risks. Discussed ASA. Reviewed  Guidelines and approved recommendations made to patient.   The patient's 10 yr CV risk was estimated at  3.2 % 11/2024      Elevated lipids: Labs ordered and we will adjust if indicated  11/24   Explained goal for LDL to be less than 100 and ideally less than 70   Continue rosuvastatin 5 mg daily   Ca cardiac score 5/24 Zero   Recommend she look into a plant based/ whole foods diet      MORENO / Stage 1 fibrosis- Labs ordered and we will adjust if indicated  11/24   Dx with RUQ US in 7/18  Recommend she avoid EtOH   She is immune to Hep B 11/2022   Fibroscan in 8/18 medium liver stiffness of 8.3. Stage 2 severe fatty liver changes.   US elastography 12/2021 Echogenic liver, likely representing a degree of hepatic steatosis. No focal lesions. Shear wave elastography does not demonstrate significant hepatic fibrosis.     She saw Dr Quinones and it was felt to be cholesterol related.   She saw Dr Bess at  10/22 and felt cholesterol related.    She is in a research protocol at [facility]   Recommend she follow with GI and make an appt for EGD   Recommend she look into a plant based/ whole foods diet      LE edema:    Monitor daily sodium intake   Recommend wearing support stockings daily   Elevate legs throughout the day and for 45 minutes at night     Depression/anxiety - She is retiring in 6/25, she has been at her job for 34 years. She admits her job is very stressful and she is exhausted. She is systemically having side effects due to her increased stressors. Recommend she speak with a therapist. I have a list of local therapist listed in the 'This Page' portion of the EMR 11/24. Recommend she check her insurance for coverage    Her friend of 25 years suddenly quit speaking to her and will not return her call.   Her daughter is in college for chemical engineering    She is not dating. She does not have a chance to meet others any more.   Recommend she consider talking with a therapist.   The therapist she was seeing  during the divorce is no longer here   She stopped Wellbutrin and Viibryd since she did not feel it helped with her Sx.  Continue Buspar 7.5 mg BID.        Epigastric pain: DDx discussed for Sx.    US elastography 12/2021 showed fatty liver but no cirrhosis      Fibromyalgia and fatigue -   She has a Dollinger hump. Recommend and orders placed for US of soft tissue left arm for further evaluation  11/2023- she has not done the US yet   Recommend she increase her activity levels. She has to make slow mindful movements   Recommend she consider acupuncture or massage therapy      Arthritis to distal phalanges:   She has pain and intermittent edema in her fingers   Keep hands moving.   She can try OTC pain relieving creams with Lidocaine for pain relief   She saw ortho and she has a splint to wear      Moderate RIK:   She saw Mirella Acosta on 6/19/2020 and diagnosed with Moderate RIK  Continue Flonase NS nightly   She would like to start with OAT eval with Dr Montoya and order in but did not go because of COVID pandemic      Psoriasis in the scalp:  She is using clobetasol propionate shampoo prn.      Vit D def: Levels 20 on labs in 11/2021   Continue OTC Vitamin D 200 UT daily     PAP normal on 11/10/2022.    Vaginal exam shows mild bladder prolapse, uterine prolapse noted, vaginal dryness and atrophy at vaginal opening 11/2022  She is not dating anyone and is not sexually active   Continue Vagifem 10 mcg twice weekly      Mammo was normal in 3/2023 and ordered 11/24.   Breast exam normal except dense bilaterally 11/2024    BD 2/2022 T-1.0. Continue OTC calcium 600 mg BID and OTC Vitamin D3 2000 UT daily and eat 2 servings of calcium enriched foods daily.   Discussed importance of weight bearing exercises      Colon 2016 polyp only and 7- 10 years - will repeat 2025   No FmHx of colon cancer      Ophth:  She saw Dr Hurt in 7/24. She has had LASIK OU in the past. She has bilat cataracts being monitored and is a glaucoma  suspect bilaterally. She has dry eyes and PVD bilaterally. No MD        Hep C 7/18 -  Hep B ab +   FLu 10/18, 11/21, 9/22, 11/23, 11/24   TDAP 2010, 12/1/2021  Prevnar never   Pneumovax never   Shingrix 5/14/2022 and 8/6/2022  Moderna CVD 2/21 and 3/21 booster 11/21, 11/22, 12/23      Some elements in the chart were copied from Dr. Torres's last office visit with patient.   Notes have been updated where appropriate, and reflect my current medical decision making from today.     RTC in 6 months for follow up or sooner if needed   (CPE due 11/2025)     Scribe Attestation  By signing my name below, I, Lilia Dasilva , Scribe   attest that this documentation has been prepared under the direction and in the presence of Mila Torres MD.

## 2024-11-25 ENCOUNTER — APPOINTMENT (OUTPATIENT)
Dept: PRIMARY CARE | Facility: CLINIC | Age: 64
End: 2024-11-25
Payer: COMMERCIAL

## 2024-11-25 VITALS
OXYGEN SATURATION: 98 % | WEIGHT: 141 LBS | DIASTOLIC BLOOD PRESSURE: 60 MMHG | TEMPERATURE: 97.4 F | HEIGHT: 62 IN | SYSTOLIC BLOOD PRESSURE: 120 MMHG | HEART RATE: 90 BPM | BODY MASS INDEX: 25.95 KG/M2

## 2024-11-25 DIAGNOSIS — R07.89 CHEST PRESSURE: ICD-10-CM

## 2024-11-25 DIAGNOSIS — F43.20 ADULT SITUATIONAL STRESS DISORDER: ICD-10-CM

## 2024-11-25 DIAGNOSIS — E78.2 MIXED HYPERLIPIDEMIA: ICD-10-CM

## 2024-11-25 DIAGNOSIS — H25.13 AGE-RELATED NUCLEAR CATARACT OF BOTH EYES: ICD-10-CM

## 2024-11-25 DIAGNOSIS — F41.9 ACUTE ANXIETY: ICD-10-CM

## 2024-11-25 DIAGNOSIS — G47.33 MODERATE OBSTRUCTIVE SLEEP APNEA: ICD-10-CM

## 2024-11-25 DIAGNOSIS — Z12.31 VISIT FOR SCREENING MAMMOGRAM: ICD-10-CM

## 2024-11-25 DIAGNOSIS — N81.10 FEMALE BLADDER PROLAPSE: ICD-10-CM

## 2024-11-25 DIAGNOSIS — M79.7 FIBROMYALGIA: ICD-10-CM

## 2024-11-25 DIAGNOSIS — Z00.00 HEALTHCARE MAINTENANCE: Primary | ICD-10-CM

## 2024-11-25 DIAGNOSIS — K75.81 NASH (NONALCOHOLIC STEATOHEPATITIS): ICD-10-CM

## 2024-11-25 DIAGNOSIS — L40.9 PSORIASIS: ICD-10-CM

## 2024-11-25 DIAGNOSIS — H40.003 GLAUCOMA SUSPECT OF BOTH EYES: ICD-10-CM

## 2024-11-25 PROCEDURE — 99396 PREV VISIT EST AGE 40-64: CPT | Performed by: INTERNAL MEDICINE

## 2024-11-25 PROCEDURE — 90656 IIV3 VACC NO PRSV 0.5 ML IM: CPT | Performed by: INTERNAL MEDICINE

## 2024-11-25 PROCEDURE — 93000 ELECTROCARDIOGRAM COMPLETE: CPT | Performed by: INTERNAL MEDICINE

## 2024-11-25 PROCEDURE — 3008F BODY MASS INDEX DOCD: CPT | Performed by: INTERNAL MEDICINE

## 2024-11-25 PROCEDURE — 1036F TOBACCO NON-USER: CPT | Performed by: INTERNAL MEDICINE

## 2024-11-25 PROCEDURE — 90471 IMMUNIZATION ADMIN: CPT | Performed by: INTERNAL MEDICINE

## 2024-11-25 RX ORDER — ROSUVASTATIN CALCIUM 5 MG/1
5 TABLET, COATED ORAL DAILY
Qty: 90 TABLET | Refills: 3 | Status: SHIPPED | OUTPATIENT
Start: 2024-11-25 | End: 2025-11-25

## 2024-11-25 NOTE — PATIENT INSTRUCTIONS
Here are local therapists-    Practice Address Suite [facility] ZIP Region Phone   [facility] [address] [place] [address] [place] [phone]   [facility] [address] [place] [address] [place] [address] [place] [phone]   [facility] [address] [facility]  [place] [address] [place] [phone]   [facility] [address] [place] [address] [place] [address] [place] [phone]   [facility] [address] [place] [address] [place] [phone]   [facility] [address] [facility] [address] [place] [phone]   [facility] [address] [address] [place] [address] [place] [phone]   [facility] [address] [facility] [address] [facility] [address] [address] [facility] [address] [place] [phone]   [facility] [address] [facility] [address] [place] [phone]   [facility] [address] [facility]  [place] [address] [place] [phone]   [facility] [address] [place] [address] [place] [phone]   [facility] [address] [address] [place] [address] [place] [phone]   [facility] [address] [address] [facility] [address] [facility] [address] [place] [address] [place] [address] [place] [phone]   [facility] [address] [facility] [address] [place] [phone]   [facility] [address] [place] [address] [place] [address] [place] [phone]    [facility] - Maryann Figueredo [address]  [place] [address] [place] [phone]        It was a pleasure to see you today.  I would like to remind you about importance of a healthy lifestyle in order to improve your well-being and live longer. Try to engage in physical activities for at least 150 minutes per week.  Eat about 10 servings of fruits and vegetables daily. My advice is 2 servings of fruits and 8 servings of vegetables.  For vegetables choose at least half of them green and at least half of them fresh.  Please avoid sugar, salt, fried food and saturated fat.  Weight loss is advised. Target BMI: below 25. Please follow low carbohydrate diet and daily exercise routine for at least 30 minutes. Nutritional consultation is available, please let me know if you are interested. I will be happy to discuss details with you if interested.   Have a good day and stay well.      The ability to age comfortably depends on how you invest in your body.   Include physical activity in your daily routine. Physical activity increases blood flow to your whole body, including your brain. ...  Eat a healthy diet. A heart-healthy diet may benefit your brain.   Stay mentally active. Be social.   Treat cardiovascular disease.  No smoking, excessive EtOH intake or illicit drug use.

## 2024-12-03 ENCOUNTER — LAB (OUTPATIENT)
Dept: LAB | Facility: LAB | Age: 64
End: 2024-12-03
Payer: COMMERCIAL

## 2024-12-03 DIAGNOSIS — Z00.00 HEALTHCARE MAINTENANCE: ICD-10-CM

## 2024-12-03 LAB
ALBUMIN SERPL BCP-MCNC: 4.3 G/DL (ref 3.4–5)
ALP SERPL-CCNC: 58 U/L (ref 33–136)
ALT SERPL W P-5'-P-CCNC: 16 U/L (ref 7–45)
ANION GAP SERPL CALC-SCNC: 12 MMOL/L (ref 10–20)
AST SERPL W P-5'-P-CCNC: 17 U/L (ref 9–39)
BILIRUB SERPL-MCNC: 0.5 MG/DL (ref 0–1.2)
BUN SERPL-MCNC: 16 MG/DL (ref 6–23)
CALCIUM SERPL-MCNC: 9.4 MG/DL (ref 8.6–10.6)
CHLORIDE SERPL-SCNC: 102 MMOL/L (ref 98–107)
CHOLEST SERPL-MCNC: 224 MG/DL (ref 0–199)
CHOLESTEROL/HDL RATIO: 3.3
CO2 SERPL-SCNC: 30 MMOL/L (ref 21–32)
CREAT SERPL-MCNC: 0.61 MG/DL (ref 0.5–1.05)
EGFRCR SERPLBLD CKD-EPI 2021: >90 ML/MIN/1.73M*2
ERYTHROCYTE [DISTWIDTH] IN BLOOD BY AUTOMATED COUNT: 11.8 % (ref 11.5–14.5)
EST. AVERAGE GLUCOSE BLD GHB EST-MCNC: 108 MG/DL
GLUCOSE SERPL-MCNC: 91 MG/DL (ref 74–99)
HBA1C MFR BLD: 5.4 %
HCT VFR BLD AUTO: 37.4 % (ref 36–46)
HDLC SERPL-MCNC: 67.9 MG/DL
HGB BLD-MCNC: 12.7 G/DL (ref 12–16)
LDLC SERPL CALC-MCNC: 120 MG/DL
MCH RBC QN AUTO: 31.8 PG (ref 26–34)
MCHC RBC AUTO-ENTMCNC: 34 G/DL (ref 32–36)
MCV RBC AUTO: 94 FL (ref 80–100)
NON HDL CHOLESTEROL: 156 MG/DL (ref 0–149)
NRBC BLD-RTO: 0 /100 WBCS (ref 0–0)
PLATELET # BLD AUTO: 267 X10*3/UL (ref 150–450)
POTASSIUM SERPL-SCNC: 4.2 MMOL/L (ref 3.5–5.3)
PROT SERPL-MCNC: 6.9 G/DL (ref 6.4–8.2)
RBC # BLD AUTO: 3.99 X10*6/UL (ref 4–5.2)
SODIUM SERPL-SCNC: 140 MMOL/L (ref 136–145)
TRIGL SERPL-MCNC: 180 MG/DL (ref 0–149)
TSH SERPL-ACNC: 1.64 MIU/L (ref 0.44–3.98)
VLDL: 36 MG/DL (ref 0–40)
WBC # BLD AUTO: 5.7 X10*3/UL (ref 4.4–11.3)

## 2024-12-03 PROCEDURE — 83036 HEMOGLOBIN GLYCOSYLATED A1C: CPT

## 2024-12-03 PROCEDURE — 80061 LIPID PANEL: CPT

## 2024-12-03 PROCEDURE — 85027 COMPLETE CBC AUTOMATED: CPT

## 2024-12-03 PROCEDURE — 36415 COLL VENOUS BLD VENIPUNCTURE: CPT

## 2024-12-03 PROCEDURE — 84443 ASSAY THYROID STIM HORMONE: CPT

## 2024-12-03 PROCEDURE — 80053 COMPREHEN METABOLIC PANEL: CPT

## 2024-12-03 NOTE — RESULT ENCOUNTER NOTE
Sandeep Lamar-  The cholesterol is not bad but the LDL still would like to be <100  The HDL remains god level at 67  The thyroid and glucose are great range   Rest of the labs are good range 
neck pain, msk pain.  Otherwise negative except as per HPI.

## 2025-01-02 ENCOUNTER — HOSPITAL ENCOUNTER (OUTPATIENT)
Dept: RADIOLOGY | Facility: CLINIC | Age: 65
Discharge: HOME | End: 2025-01-02
Payer: COMMERCIAL

## 2025-01-02 VITALS — BODY MASS INDEX: 26.62 KG/M2 | HEIGHT: 61 IN | WEIGHT: 141 LBS

## 2025-01-02 DIAGNOSIS — Z12.31 VISIT FOR SCREENING MAMMOGRAM: ICD-10-CM

## 2025-01-02 PROCEDURE — 77067 SCR MAMMO BI INCL CAD: CPT

## 2025-01-28 ENCOUNTER — TELEPHONE (OUTPATIENT)
Dept: PRIMARY CARE | Facility: CLINIC | Age: 65
End: 2025-01-28
Payer: COMMERCIAL

## 2025-01-28 NOTE — TELEPHONE ENCOUNTER
Pt called- has some questions regarding her mammogram. Pt ws told she has dense breast tissue. She has some questions about future mammograms and what that entails.

## 2025-01-28 NOTE — TELEPHONE ENCOUNTER
Talked with pt and she is not high risk and we do the mammogram + jaguar   She can do the $250 MRI or ultrasound but all will be out of pocket and she declined for now   Continue yearly mammogram for her dense breast

## 2025-03-26 ENCOUNTER — OFFICE VISIT (OUTPATIENT)
Dept: PRIMARY CARE | Facility: CLINIC | Age: 65
End: 2025-03-26
Payer: COMMERCIAL

## 2025-03-26 VITALS
WEIGHT: 142.4 LBS | RESPIRATION RATE: 18 BRPM | DIASTOLIC BLOOD PRESSURE: 70 MMHG | SYSTOLIC BLOOD PRESSURE: 116 MMHG | OXYGEN SATURATION: 98 % | BODY MASS INDEX: 26.88 KG/M2 | HEIGHT: 61 IN | HEART RATE: 74 BPM | TEMPERATURE: 97.2 F

## 2025-03-26 DIAGNOSIS — N30.01 ACUTE CYSTITIS WITH HEMATURIA: Primary | ICD-10-CM

## 2025-03-26 LAB
POC APPEARANCE, URINE: CLEAR
POC BILIRUBIN, URINE: NEGATIVE
POC BLOOD, URINE: ABNORMAL
POC COLOR, URINE: YELLOW
POC GLUCOSE, URINE: NEGATIVE MG/DL
POC KETONES, URINE: NEGATIVE MG/DL
POC LEUKOCYTES, URINE: NEGATIVE
POC NITRITE,URINE: NEGATIVE
POC PH, URINE: 6.5 PH
POC PROTEIN, URINE: NEGATIVE MG/DL
POC SPECIFIC GRAVITY, URINE: 1.01
POC UROBILINOGEN, URINE: 0.2 EU/DL

## 2025-03-26 PROCEDURE — 1036F TOBACCO NON-USER: CPT | Performed by: NURSE PRACTITIONER

## 2025-03-26 PROCEDURE — 81002 URINALYSIS NONAUTO W/O SCOPE: CPT | Performed by: NURSE PRACTITIONER

## 2025-03-26 PROCEDURE — 3008F BODY MASS INDEX DOCD: CPT | Performed by: NURSE PRACTITIONER

## 2025-03-26 PROCEDURE — 99214 OFFICE O/P EST MOD 30 MIN: CPT | Performed by: NURSE PRACTITIONER

## 2025-03-26 RX ORDER — NITROFURANTOIN 25; 75 MG/1; MG/1
100 CAPSULE ORAL 2 TIMES DAILY
Qty: 14 CAPSULE | Refills: 0 | Status: SHIPPED | OUTPATIENT
Start: 2025-03-26 | End: 2025-04-02

## 2025-03-26 ASSESSMENT — ENCOUNTER SYMPTOMS
HEMATURIA: 1
DIFFICULTY URINATING: 0
DYSURIA: 0
FLANK PAIN: 0

## 2025-03-26 NOTE — PROGRESS NOTES
"Subjective   Patient ID: Brianda Waller is a 64 y.o. female who presents for UTI (Urgency to urine. Pt was sick and dehydrated with diarrhea and vomiting and got better then but pt was wondering if it was related. Pt is taking Azo Urine pain relief over the counter. ).    She is having urgency, no burning or pain. She did take Azo x 3 days prior but did not help.          Review of Systems   Genitourinary:  Positive for hematuria and urgency. Negative for difficulty urinating, dysuria and flank pain.       Objective   /70   Pulse 74   Temp 36.2 °C (97.2 °F)   Resp 18   Ht 1.549 m (5' 1\")   Wt 64.6 kg (142 lb 6.4 oz)   SpO2 98%   BMI 26.91 kg/m²     Physical Exam  Vitals and nursing note reviewed.   Constitutional:       General: She is not in acute distress.  HENT:      Head: Normocephalic and atraumatic.   Pulmonary:      Effort: Pulmonary effort is normal.   Skin:     General: Skin is warm and dry.   Neurological:      Mental Status: She is alert and oriented to person, place, and time.   Psychiatric:         Mood and Affect: Mood normal.         Assessment/Plan   Problem List Items Addressed This Visit    None  Visit Diagnoses         Codes    Acute cystitis with hematuria    -  Primary N30.01    Relevant Medications    nitrofurantoin, macrocrystal-monohydrate, (Macrobid) 100 mg capsule    Other Relevant Orders    POCT UA (nonautomated) manually resulted (Completed)    Urine Culture               "

## 2025-03-28 LAB — BACTERIA UR CULT: NORMAL

## 2025-04-06 DIAGNOSIS — M79.7 FIBROMYALGIA: ICD-10-CM

## 2025-04-06 RX ORDER — BUSPIRONE HYDROCHLORIDE 7.5 MG/1
TABLET ORAL
Qty: 180 TABLET | Refills: 1 | Status: SHIPPED | OUTPATIENT
Start: 2025-04-06

## 2025-05-25 NOTE — PROGRESS NOTES
Subjective   Patient ID: Brianda Waller is a 64 y.o. female who presents for her 6 month follow up multiple medical conditions      She is retiring in 6/25, she has been at her job for 34 years.      She complains that she is falling asleep randomly  She is taking Buspar in the mornings     She has pain in all her muscles and joints and has trouble going up and down steps when flared   She has intermittent constipation   She has been off rosuvastatin for a couple weeks    She is alternating Tums and Gaviscon for reflux   She can not tolerate EtOH due to GERD     She is scheduled to see Dr Henry in 7/25 for her eyes     HEALTH:  PAP 4/13, 8/17, 11/22 and Q 5   Mammo 5/18, 7/19, 2/22, 3/23, 1/25  BD 2/13 T+1.3, 2/22 T-1.0   Colon 7/16 benign polyps and ordered 5/25  Ca cardiac score 5/24 Zero    EKG 6/17, 8/18, 4/20, 11/23, 11/24   Urine 8/18,    Hep C 7/18 -  She is immune to Hep B 11/22   FLu 10/18, 11/21, 9/22, 11/23, 11/24   TDAP 2010, 12/1/2021  Prevnar never   Pneumovax 4/22   Shingrix 5/14/2022 and 8/6/2022  SARS-CoV-2- 2/21, 3/21,11/21, 11/22, 12/23  Ophth She saw Dr Hurt in 7/24. She has had LASIK OU in the past. She has bilat cataracts being monitored and is a glaucoma suspect bilaterally. She has dry eyes and PVD bilaterally. No MD        Review of Systems  All systems negative except those listed in the HPI      Objective   Visit Vitals  /60 (BP Location: Left arm, Patient Position: Sitting, BP Cuff Size: Adult)   Pulse 80   Resp 16    Body mass index is 27.02 kg/m².     Physical Exam  Vitals reviewed.   Constitutional:       Appearance: Normal appearance.   HENT:      Head: Normocephalic.      Right Ear: Tympanic membrane, ear canal and external ear normal.      Left Ear: Tympanic membrane, ear canal and external ear normal.      Nose: Nose normal.      Mouth/Throat:      Pharynx: Oropharynx is clear.   Eyes:      Conjunctiva/sclera: Conjunctivae normal.   Cardiovascular:      Rate and Rhythm:  Normal rate and regular rhythm.      Pulses: Normal pulses.      Heart sounds: Normal heart sounds.   Pulmonary:      Effort: Pulmonary effort is normal.      Breath sounds: Normal breath sounds.   Abdominal:      General: Bowel sounds are normal.      Palpations: Abdomen is soft.      Tenderness: There is abdominal tenderness.   Musculoskeletal:         General: Normal range of motion.      Cervical back: Normal range of motion and neck supple.      Comments: Crepitation left knee,   + tenderness to all the trigger points    Skin:     General: Skin is warm.   Neurological:      General: No focal deficit present.      Mental Status: She is alert and oriented to person, place, and time.   Psychiatric:         Mood and Affect: Mood normal.         Behavior: Behavior normal.         Thought Content: Thought content normal.         Judgment: Judgment normal.       Assessment/Plan   Problem List Items Addressed This Visit       Fibromyalgia    Relevant Medications    busPIRone (Buspar) 7.5 mg tablet    MORENO (nonalcoholic steatohepatitis) - Primary    Relevant Orders    CBC    Comprehensive Metabolic Panel     Other Visit Diagnoses         Gastroesophageal reflux disease with esophagitis without hemorrhage        Relevant Orders    Esophagogastroduodenoscopy (EGD)      Colon cancer screening        Relevant Orders    Colonoscopy Screening; Average Risk Patient      Iron overload        Relevant Orders    Ferritin      Anemia, unspecified type        Relevant Orders    Ferritin      Abnormal liver function        Relevant Orders    Lactate Dehydrogenase            Follow up completed  Reviewed her labs from 12/24 and 3/25   Labs ordered       She is  with 1 daughter. She denies previous history of tobacco use  She works full time as a school psychologist   She is retiring in 6/25, she has been at her job for 34 years.      Seen in  3/25 and Dx with acute cystisis with hematuria: resolved with treatment   Rx given  for Macrobid      Her weight/ BMI is in normal range if office, recommend she maintain   Her weight is 143 pounds with BMI at 27.02 IO 5/25  I would like to see her BMI as close to 25 as possible   Recommend she look into a plant based/ whole foods diet    She is exercising routinely and stays active throughout the day     HTN: BP Stable.    She is no longer taking Losartan. We will continue to monitor   EKG 11/2024 normal, no LVH or strain pattern noted   Stress test 5/2020 was normal   She will continue to monitor her BP at home and call with elevated readings.      I have spent 15 min face to face with this patient discussing their cardiac risk   We discussed the patients cardiovascular risk. If needed, lifestyle modifications recommended including: behavioral therapies of nutrition choices, exercise and eliminate habits that are contributing to their cardiac risk. We agreed to a plan to decrease his cardiovascular risks. Discussed ASA. Reviewed Guidelines and approved recommendations made to patient.   The patient's 10 yr CV risk was estimated at  3.6 % IO 5/25      Elevated lipids:  and HDL 67 on labs in 12/24. She has pain in all her muscles and joints and has trouble going up and down steps when flared. She has intermittent constipation. She has been off rosuvastatin for a couple weeks. Recommend she stay off rosuvastatin and see if her Sx improve   Explained goal for LDL to be less than 100 and ideally less than 70   She stopped rosuvastatin due muscle/ joint pain and constipation   Ca cardiac score 5/24 Zero   Recommend she look into a plant based/ whole foods diet      MORENO / Stage 1 fibrosis- Cr 0.61 on labs in 12/24.   Dx by RUQ US in 7/18  Recommend she avoid EtOH   She is immune to Hep B 11/2022   Fibroscan in 8/18 medium liver stiffness of 8.3. Stage 2 severe fatty liver changes.   US elastography 12/2021 showed fatty liver but no cirrhosis   She saw Dr Quinones and it was felt to be cholesterol  related.   She saw Dr Bess at  10/22 and felt cholesterol related.    She is in a research protocol at [facility]   Recommend she look into a plant based/ whole foods diet   She is seeing Dr Núñez in GI prn    GERD: On exam: diffuse abdomen tenderness 5/25.   She is alternating Tums and Gaviscon for reflux   Recommend she stop Tums and Gaviscon   She can not tolerate EtOH due to GERD   Recommend she begin Pepcid OTC 20 mg BID for a couple months then she can pull back to QD  Recommend she avoid foods that exacerbate her Sx   Recommend she look into a plant based/ whole foods diet   Recommend and orders placed for EGD for further evaluation- she is also due to have a colonoscopy and order placed for both 5/25. Recommend doing both tests at the end of 7/25.      LE edema:    Monitor daily sodium intake   Recommend wearing support stockings daily   Elevate legs throughout the day and for 45 minutes at night     Depression/anxiety - She complains that she is falling asleep randomly during the day. She is taking Buspar in the mornings. Recommend she change Buspar to one tablet at night   Continue Buspar 7.5 mg QHS.     She stopped Wellbutrin and Viibryd since she did not feel it helped with her Sx.   She is retiring in 6/25, she has been at her job for 34 years.    Her friend of 25 years suddenly quit speaking to her and will not return her call.   Her daughter is in college for chemical engineering    She is not dating. She does not have a chance to meet others any more.   Recommend she consider talking with a therapist.   The therapist she was seeing during the divorce is no longer here     She has a Dollinger hump.   Orders placed for US of soft tissue left arm for further evaluation  11/23- she did not do the US     Fibromyalgia and fatigue - On exam: + tenderness to all the trigger points 5/25  Recommend she increase her activity levels. She has to make slow mindful movements   Recommend she consider acupuncture or  massage therapy      Arthritis to distal phalanges:   She has pain and intermittent edema in her fingers   Keep hands moving.   She can try OTC pain relieving creams with Lidocaine for pain relief   She saw ortho and she has a splint to wear      Moderate RIK:   She saw Mirella Acosta on 6/19/2020 and diagnosed with Moderate RIK     Psoriasis in the scalp:  She is using clobetasol propionate shampoo prn.      Vit D def: Levels 20 on labs in 11/2021   Continue OTC Vitamin D3 2000 UT daily     PAP normal on 11/10/2022.    Vaginal exam shows mild bladder prolapse, uterine prolapse noted, vaginal dryness and atrophy at vaginal opening 11/2022  She is not dating anyone and is not sexually active   Continue Vagifem 10 mcg twice weekly      Mammo was normal in 1/25   Breast exam normal except dense bilaterally 11/2024     BD 2/22 T-1.0. Continue OTC calcium 600 mg BID and OTC Vitamin D3 2000 UT daily and eat 2 servings of calcium enriched foods daily.   Discussed importance of weight bearing exercises      Colon 2016 polyp only and ordered 5/25  No FmHx of colon cancer      Ophth: She is scheduled to see Dr Henry in 7/25   She saw Dr Henry in 7/24. She has had LASIK OU in the past. She has bilat cataracts being monitored and is a glaucoma suspect bilaterally. She has dry eyes and PVD bilaterally. No MD         Hep C 7/18 -  She is immune to Hep B 11/22   FLu 10/18, 11/21, 9/22, 11/23, 11/24   TDAP 2010, 12/1/2021  Prevnar never   Pneumovax 4/22   Shingrix 5/14/2022 and 8/6/2022  SARS-CoV-2- 2/21, 3/21,11/21, 11/22, 12/23     Some elements in the chart were copied from Dr. Torres's last office visit with patient. Notes have been updated where appropriate, and reflect my current medical decision making from today.     RTC in 6 months for follow up or sooner if needed   (CPE due 11/2025)     Scribe Attestation  By signing my name below, Lilia NEVILLE, Scribe attest that this documentation has been prepared under the direction and  in the presence of Mila Torres MD.

## 2025-05-28 ENCOUNTER — APPOINTMENT (OUTPATIENT)
Dept: PRIMARY CARE | Facility: CLINIC | Age: 65
End: 2025-05-28
Payer: COMMERCIAL

## 2025-05-28 VITALS
HEART RATE: 80 BPM | WEIGHT: 143 LBS | BODY MASS INDEX: 27 KG/M2 | HEIGHT: 61 IN | RESPIRATION RATE: 16 BRPM | OXYGEN SATURATION: 98 % | SYSTOLIC BLOOD PRESSURE: 124 MMHG | DIASTOLIC BLOOD PRESSURE: 60 MMHG

## 2025-05-28 DIAGNOSIS — R94.5 ABNORMAL LIVER FUNCTION: ICD-10-CM

## 2025-05-28 DIAGNOSIS — K75.81 NASH (NONALCOHOLIC STEATOHEPATITIS): ICD-10-CM

## 2025-05-28 DIAGNOSIS — M79.7 FIBROMYALGIA: ICD-10-CM

## 2025-05-28 DIAGNOSIS — E83.19 IRON OVERLOAD: ICD-10-CM

## 2025-05-28 DIAGNOSIS — K21.00 GASTROESOPHAGEAL REFLUX DISEASE WITH ESOPHAGITIS WITHOUT HEMORRHAGE: Primary | ICD-10-CM

## 2025-05-28 DIAGNOSIS — D64.9 ANEMIA, UNSPECIFIED TYPE: ICD-10-CM

## 2025-05-28 DIAGNOSIS — Z12.11 COLON CANCER SCREENING: ICD-10-CM

## 2025-05-28 DIAGNOSIS — M79.10 MYALGIA: ICD-10-CM

## 2025-05-28 DIAGNOSIS — R29.898 WEAKNESS OF BOTH LOWER EXTREMITIES: ICD-10-CM

## 2025-05-28 PROCEDURE — 99214 OFFICE O/P EST MOD 30 MIN: CPT | Performed by: INTERNAL MEDICINE

## 2025-05-28 PROCEDURE — 3008F BODY MASS INDEX DOCD: CPT | Performed by: INTERNAL MEDICINE

## 2025-05-28 PROCEDURE — 1036F TOBACCO NON-USER: CPT | Performed by: INTERNAL MEDICINE

## 2025-05-28 RX ORDER — FAMOTIDINE 20 MG/1
20 TABLET, FILM COATED ORAL 2 TIMES DAILY
COMMUNITY

## 2025-05-28 RX ORDER — BUSPIRONE HYDROCHLORIDE 7.5 MG/1
TABLET ORAL
Qty: 90 TABLET | Refills: 1 | Status: SHIPPED | OUTPATIENT
Start: 2025-05-28

## 2025-05-28 ASSESSMENT — PATIENT HEALTH QUESTIONNAIRE - PHQ9
SUM OF ALL RESPONSES TO PHQ9 QUESTIONS 1 AND 2: 0
2. FEELING DOWN, DEPRESSED OR HOPELESS: NOT AT ALL
1. LITTLE INTEREST OR PLEASURE IN DOING THINGS: NOT AT ALL

## 2025-05-28 NOTE — PATIENT INSTRUCTIONS
It was a pleasure to see you today.  I would like to remind you about importance of a healthy lifestyle in order to improve your well-being and live longer. Try to engage in physical activities for at least 150 minutes per week.  Eat about 10 servings of fruits and vegetables daily. My advice is 2 servings of fruits and 8 servings of vegetables. For vegetables choose at least half of them green and at least half of them fresh.  Please avoid sugar, salt, fried food and saturated fat.  Weight loss is advised. Target BMI: below 25. Please follow low carbohydrate diet and daily exercise routine for at least 30 minutes. Nutritional consultation is available, please let me know if you are interested. I will be happy to discuss details with you if interested.   Have a good day and stay well.      The ability to age comfortably depends on how you invest in your body.   Include physical activity in your daily routine.   Physical activity increases blood flow to your whole body, including your brain.    Eat a healthy diet. A heart-healthy diet may benefit your brain.   Stay mentally active. Be social.   Treat cardiovascular disease.  No smoking, excessive EtOH intake or illicit drug use.

## 2025-05-29 ENCOUNTER — TELEPHONE (OUTPATIENT)
Dept: GASTROENTEROLOGY | Facility: EXTERNAL LOCATION | Age: 65
End: 2025-05-29
Payer: COMMERCIAL

## 2025-07-14 ENCOUNTER — APPOINTMENT (OUTPATIENT)
Dept: GASTROENTEROLOGY | Facility: EXTERNAL LOCATION | Age: 65
End: 2025-07-14
Payer: COMMERCIAL

## 2025-08-12 ENCOUNTER — OFFICE VISIT (OUTPATIENT)
Dept: PRIMARY CARE | Facility: CLINIC | Age: 65
End: 2025-08-12
Payer: COMMERCIAL

## 2025-08-12 VITALS
HEART RATE: 87 BPM | WEIGHT: 141 LBS | OXYGEN SATURATION: 97 % | DIASTOLIC BLOOD PRESSURE: 80 MMHG | BODY MASS INDEX: 26.62 KG/M2 | HEIGHT: 61 IN | SYSTOLIC BLOOD PRESSURE: 120 MMHG | RESPIRATION RATE: 16 BRPM | TEMPERATURE: 98 F

## 2025-08-12 DIAGNOSIS — J01.10 ACUTE NON-RECURRENT FRONTAL SINUSITIS: Primary | ICD-10-CM

## 2025-08-12 PROCEDURE — 99213 OFFICE O/P EST LOW 20 MIN: CPT | Performed by: NURSE PRACTITIONER

## 2025-08-12 PROCEDURE — 3008F BODY MASS INDEX DOCD: CPT | Performed by: NURSE PRACTITIONER

## 2025-08-12 RX ORDER — AZITHROMYCIN 250 MG/1
TABLET, FILM COATED ORAL
Qty: 6 TABLET | Refills: 0 | Status: SHIPPED | OUTPATIENT
Start: 2025-08-12

## 2025-08-12 RX ORDER — METHYLPREDNISOLONE 4 MG/1
TABLET ORAL
Qty: 21 TABLET | Refills: 0 | Status: SHIPPED | OUTPATIENT
Start: 2025-08-12 | End: 2025-08-18

## 2025-08-12 ASSESSMENT — ENCOUNTER SYMPTOMS
FEVER: 0
SINUS PAIN: 1
CHILLS: 0
TROUBLE SWALLOWING: 0
FATIGUE: 0
SINUS PRESSURE: 1
SORE THROAT: 1

## 2025-12-01 ENCOUNTER — APPOINTMENT (OUTPATIENT)
Dept: PRIMARY CARE | Facility: CLINIC | Age: 65
End: 2025-12-01
Payer: COMMERCIAL

## 2026-01-19 ENCOUNTER — APPOINTMENT (OUTPATIENT)
Dept: ALLERGY | Facility: CLINIC | Age: 66
End: 2026-01-19
Payer: COMMERCIAL